# Patient Record
Sex: FEMALE | Race: WHITE | Employment: FULL TIME | URBAN - METROPOLITAN AREA
[De-identification: names, ages, dates, MRNs, and addresses within clinical notes are randomized per-mention and may not be internally consistent; named-entity substitution may affect disease eponyms.]

---

## 2017-01-04 ENCOUNTER — HOSPITAL ENCOUNTER (OUTPATIENT)
Dept: PHYSICAL THERAPY | Age: 62
Discharge: HOME | End: 2017-01-04
Payer: COMMERCIAL

## 2017-01-04 PROCEDURE — 97140 MANUAL THERAPY 1/> REGIONS: CPT

## 2017-01-04 NOTE — PROGRESS NOTES
Jeni Dockery   (DPM:7/75/0315) [address]  at  [facility] LISHA THOMAS  [address] [facility], [address] [place] [address],[address], [place] [address].  Phone: [phone] Fax: [phone]       Outpatient PHYSICAL THERAPY: Daily Note 1/4/2017  Fall Risk Score: 0 (? 5 = High Risk)  ICD-10: Treatment Diagnosis: Pain in left shoulder (M25.512); Bicipital tendinitis, left shoulder (M75.22); Complete rotator cuff tear or rupture of left shoulder, not specified as traumatic (Y57.942)  REFERRING PHYSICIAN: Joshua Combs MD MD Orders: Evaluate and treat, strengthening, ROM, full motion/full strength  Return Physician Appointment:1-11-17   MEDICAL/REFERRING DIAGNOSIS: s/p L shoulder EUA/ manipulation/ LAURENT/ scope ASD/ ADCR/ BT  DATE OF ONSET: 9-29-16   PRIOR LEVEL OF FUNCTION: independent with ADLs  PRECAUTIONS/ALLERGIES: codeine  ASSESSMENT:  ?????? ? ? This section established at most recent assessment??????????  Ms. Dedra Kimball presents s/p L shoulder scope with mini open rotator cuff repair and bicep tenodesis with surgery 9-29-16. She had an examination and manipulation of left shoulder under anesthesia on 12-9-16. She had improved passive and active ROM today after manipulation. She is slowly progressing with L shoulder active and passive ROM. She does report left hand pain/thumb pain that started after the surgery. She will benefit from continuing physical therapy to progress functional mobility in L UE. PROBLEM LIST (Impairments causing functional limitations):  1. Post-op L shoulder pain and swelling  2. Decreased L shoulder ROM   3. Weakness L shoulder   GOALS: (Goals have been discussed and agreed upon with patient.)  SHORT-TERM FUNCTIONAL GOALS: Time Frame: 6 weeeks  1. Report no more than 2/10 intermittent pain to Left shoulder with compensatory use during basic functional activities. Progressing towards  2.  Left shoulder PROM forward elevation greater than 140 degrees and external rotation greater than 60 degrees to progress into functional ranges. GOAL MET  3. Demonstrate good Left shoulder isometric strength with manual testing to progress into strength phase. GOAL MET  4. Independent with initial HEP. GOAL MET  DISCHARGE GOALS:  Time Frame: 12 weeks   1. No more than 1/10 intermittent pain Left shoulder with return to normalized household and work activities, and score less than 20% on the DASH. ongoing  2. Left shoulder AROM forward elevation greater than 135 degrees, external rotation greater than 60 degrees, and strength to shoulder are grossly WNL's for safe use with normalized activities. Progressing towards  1. Demonstrate good functional shoulder strength and endurance for return to normalized household and work activities. Progressing towards  2. Independent with advanced shoulder HEP for continued self-management. ongoing  REHABILITATION POTENTIAL FOR STATED GOALS: Emily Thakur OF CARE:  INTERVENTIONS PLANNED: (Benefits and precautions of physical therapy have been discussed with the patient.)  1. Thermal and electric modalities, manual therapies for pain   2. Manual therapies, therapeutic exercises, HEP for ROM    3. Therapeutic exercises and HEP for strength  TREATMENT PLAN EFFECTIVE DATES: 10-4-16 TO 12-30-16  FREQUENCY/DURATION: Follow patient 2 times a week for 12 weeks to address above goals. Pt increase to 3x/week if shoulder ROM decreases                        SUBJECTIVE:  Present Symptoms: Pt reports she has pain in the upper arm and she continues to have thumb pain. History of Present Injury/Illness (Reason for Referral): Shoulder pain for over a year. She tried to work out on her own. Pain with lifting arm all the way up and reaching behind back prior to surgery. Does not remember an injury but was a  and carried everything in her left hand. Surgery Thursday 9-29-16 and stayed one night in the hospital. 2nd nerve block Friday morning and therapy in the hospital prior to be discharged.  She has been icing the shoulder and taking the pain medicine. Shoulder manipulation done 12-9-16. Dominant Side: right  Past Medical History: hysterectomy, 2 C-sections  Current Medications: sertraline HCL, hydro morphine, promethazine, ketorolac, temazepam, aleve, tylenol   Date Last Reviewed: 1-4-16  Social History/Home Situation: Lives with  at home . Work/Activity History:  2 days/ week. 8 hours /week. Would like to return to working out. OBJECTIVE:  Outcome Measure: Tool Used: Disabilities of the Arm, Shoulder and Hand (DASH) Questionnaire - Quick Version  Score:  Initial: 25/55  Most Recent: 35/55 (Date: 11-1-16 )   Interpretation of Score: The DASH is designed to measure the activities of daily living in person's with upper extremity dysfunction or pain. Each section is scored on a 1-5 scale, 5 representing the greatest disability. The scores of each section are added together for a total score of 55. This number is divided by 11, followed by subtracting 1 and multiplying by 25 to get a percent score of disability. This value represents the percentage disability: 0-20% minimal disability; 20-40% moderate disability; 40-60% severe disability; % dependent for care or exaggerated symptom behavior. Minimal detectable change is 12%. Observation/Orthostatic Postural Assessment: not changed. Palpation: n/t    ROM: not tested                          Strength: n/t                 TREATMENT:    (In addition to Assessment/Re-Assessment sessions the following treatments were rendered)  Therapeutic Exercise (5 Minutes ) for improved shoulder ROM and mobility. Serratus punch 2x10, side lying ER 2x15, prone shoulder extension 2x10. Kinesio taping for posture with I strip across scapula. HEP: continue current HEP  Manual Therapy (Left Joint Mobilization Duration  Duration: 40 Minutes    ): for L shoulder ROM.  PROM to physiological mobilizations to the L shoulder for ER at neutral, ER at 45 deg abduction, IR, abduction, and forward elevation. Posterior and inferior glenohumeral joint glides grade 2-3 in mid and end range motions for improved mobility. Pt prone and glenohumeral anterior glides with belt to stabilize the scapula grade 4. Pt supine and cervical side glides. Therapeutic Modalities: none today                                                                                               __________________________________________________________________________________  Treatment Assessment: Pt reported thumb pain with cervical mobilizations. ER PROM seemed improved after prone glenohumeral anterior glides. Progression/Medical Necessity:   · Patient is expected to demonstrate progress in strength and range of motion to improved mobility. Compliance with Program/Exercises: yes per pt   Reason for Continuation of Services/Other Comments:  · Patient continues to require skilled intervention due to decreased ROM and UE strength. Recommendations/Intent for next treatment session: \"Treatment next visit will focus on L shoulder post-op\" ROM.     Total Treatment Duration:   45 minutes  PT Patient Time In/Time Out  Time In: 0930  Time Out: [address] Nilsa Abdi, PT, DPT

## 2017-01-06 ENCOUNTER — HOSPITAL ENCOUNTER (OUTPATIENT)
Dept: PHYSICAL THERAPY | Age: 62
Discharge: HOME | End: 2017-01-06
Payer: COMMERCIAL

## 2017-01-06 PROCEDURE — 97140 MANUAL THERAPY 1/> REGIONS: CPT

## 2017-01-06 NOTE — PROGRESS NOTES
Boris Ying   (KVN:1/16/6234) [address]  at  [facility] [place]  1101 [facility], [address] [place] [address],[address], [facility] [address]  Phone: [phone] Fax: [phone]       Outpatient PHYSICAL THERAPY: Daily Note, Progress Report and Recertification 2/3/4696  Fall Risk Score: 0 (? 5 = High Risk)  ICD-10: Treatment Diagnosis: Pain in left shoulder (M25.512); Bicipital tendinitis, left shoulder (M75.22); Complete rotator cuff tear or rupture of left shoulder, not specified as traumatic (T60.629)  REFERRING PHYSICIAN: Kera Trujillo MD MD Orders: Evaluate and treat, strengthening, ROM, full motion/full strength  Return Physician Appointment:1-11-17   MEDICAL/REFERRING DIAGNOSIS: s/p L shoulder EUA/ manipulation/ LAURENT/ scope ASD/ ADCR/ BT  DATE OF ONSET: 9-29-16   PRIOR LEVEL OF FUNCTION: independent with ADLs  PRECAUTIONS/ALLERGIES: codeine  ASSESSMENT:  ?????? ? ? This section established at most recent assessment??????????  Ms. Yris Espino presents s/p L shoulder scope with mini open rotator cuff repair and bicep tenodesis with surgery 9-29-16. She had an examination and manipulation of left shoulder under anesthesia on 12-9-16. She has progressed with L shoulder active and passive ROM. She does report left hand/thumb pain that started after the surgery and that seems to be her biggest complaint of pain. She will benefit from continuing physical therapy to progress functional mobility in L UE. PROBLEM LIST (Impairments causing functional limitations):  1. Post-op L shoulder pain and swelling  2. Decreased L shoulder ROM   3. Weakness L shoulder   GOALS: (Goals have been discussed and agreed upon with patient.)  SHORT-TERM FUNCTIONAL GOALS: Time Frame: 6 weeeks  1. Report no more than 2/10 intermittent pain to Left shoulder with compensatory use during basic functional activities. Progressing towards  2.  Left shoulder PROM forward elevation greater than 140 degrees and external rotation greater than 60 degrees to progress into functional ranges. GOAL MET  3. Demonstrate good Left shoulder isometric strength with manual testing to progress into strength phase. GOAL MET  4. Independent with initial HEP. GOAL MET  DISCHARGE GOALS:  Time Frame: 12 weeks   1. No more than 1/10 intermittent pain Left shoulder with return to normalized household and work activities, and score less than 20% on the DASH. ongoing  2. Left shoulder AROM forward elevation greater than 135 degrees, external rotation greater than 60 degrees, and strength to shoulder are grossly WNL's for safe use with normalized activities. MET for FE, progressing towards ER  1. Demonstrate good functional shoulder strength and endurance for return to normalized household and work activities. Progressing towards  2. Independent with advanced shoulder HEP for continued self-management. ongoing  REHABILITATION POTENTIAL FOR STATED GOALS: Calvin Morrow OF CARE:  INTERVENTIONS PLANNED: (Benefits and precautions of physical therapy have been discussed with the patient.)  1. Thermal and electric modalities, manual therapies for pain   2. Manual therapies, therapeutic exercises, HEP for ROM    3. Therapeutic exercises and HEP for strength  TREATMENT PLAN EFFECTIVE DATES: 12-30-16 to 2-24-17  FREQUENCY/DURATION: Follow patient 2 times a week for 12 weeks to address above goals. Pt increase to 3x/week if shoulder ROM decreases  Regarding Eliza Anthony's therapy, I certify that the treatment plan above will be carried out by a therapist or under their direction. Thank you for this referral,    Katie Duke, PT       Referring Physician Signature: Leeanne Lazaro MD          Date                                  SUBJECTIVE:  Present Symptoms: Pt reports she has been working on her HEP. Pain, throbbing and burning in L thumb. History of Present Injury/Illness (Reason for Referral): Shoulder pain for over a year. She tried to work out on her own.  Pain with lifting arm all the way up and reaching behind back prior to surgery. Does not remember an injury but was a  and carried everything in her left hand. Surgery Thursday 9-29-16 and stayed one night in the hospital. 2nd nerve block Friday morning and therapy in the hospital prior to be discharged. She has been icing the shoulder and taking the pain medicine. Shoulder manipulation done 12-9-16. Dominant Side: right  Past Medical History: hysterectomy, 2 C-sections  Current Medications: sertraline HCL, hydro morphine, promethazine, ketorolac, temazepam, aleve, tylenol   Date Last Reviewed: 1-4-16  Social History/Home Situation: Lives with  at home . Work/Activity History:  2 days/ week. 8 hours /week. Would like to return to working out. OBJECTIVE:  Outcome Measure: Tool Used: Disabilities of the Arm, Shoulder and Hand (DASH) Questionnaire - Quick Version  Score:  Initial: 25/55  35/55 (Date: 11-1-16 ) Date: 1-6-17 33/55   Interpretation of Score: The DASH is designed to measure the activities of daily living in person's with upper extremity dysfunction or pain. Each section is scored on a 1-5 scale, 5 representing the greatest disability. The scores of each section are added together for a total score of 55. This number is divided by 11, followed by subtracting 1 and multiplying by 25 to get a percent score of disability. This value represents the percentage disability: 0-20% minimal disability; 20-40% moderate disability; 40-60% severe disability; % dependent for care or exaggerated symptom behavior. Minimal detectable change is 12%. Observation/Orthostatic Postural Assessment: not changed.    Palpation: n/t  ROM:      LUE AROM  L Shoulder Flexion: 140  L Shoulder Internal Rotation:  (T12 behind back)  L Shoulder External Rotation: 40 (with arm at neutral)  LUE PROM  L Shoulder Flexion: 160  L Shoulder ABduction: 95  L Shoulder Internal Rotation: 70  L Shoulder External Rotation: 70 (at neutral, 80 at 90 deg abd)                    Strength: n/t                 TREATMENT:    (In addition to Assessment/Re-Assessment sessions the following treatments were rendered)  Therapeutic Exercise (10 Minutes ) for improved shoulder ROM and mobility. Serratus punch 2x10, side lying ER 25x, 1#2x10, prone shoulder extension1# 2x10, prone middle trap 2x10, prone lower trap 2x10 with tactile cues on scapula for correct positioning. leukotape applied to thoracic spine for posture with a \"x\". HEP: continue current HEP  Manual Therapy (Left Joint Mobilization Duration  Duration: 40 Minutes    ): for L shoulder ROM. PROM to physiological mobilizations to the L shoulder for ER at neutral, ER at 45 deg abduction, IR, abduction, and forward elevation. Posterior and inferior glenohumeral joint glides grade 2-3 in mid and end range motions for improved mobility. Pt prone and glenohumeral anterior glides with belt to stabilize the scapula grade 4. Pt supine and cervical side glides. Therapeutic Modalities: Cold to the L shoulder with vaso pneumatic  compression for 10 min with low compression                        Left Shoulder Cold  Type: Cold/ice pack                                                                      __________________________________________________________________________________  Treatment Assessment: Improved L shoulder ROM today. Progression/Medical Necessity:   · Patient is expected to demonstrate progress in strength and range of motion to improved mobility. Compliance with Program/Exercises: yes per pt   Reason for Continuation of Services/Other Comments:  · Patient continues to require skilled intervention due to decreased ROM and UE strength. Recommendations/Intent for next treatment session: \"Treatment next visit will focus on L shoulder post-op\" ROM.     Total Treatment Duration:  60 minutes  PT Patient Time In/Time Out  Time In: 0930  Time Out: [address], PT, DPT

## 2017-01-09 ENCOUNTER — HOSPITAL ENCOUNTER (OUTPATIENT)
Dept: PHYSICAL THERAPY | Age: 62
Discharge: HOME | End: 2017-01-09
Payer: COMMERCIAL

## 2017-01-09 PROCEDURE — 97110 THERAPEUTIC EXERCISES: CPT

## 2017-01-09 PROCEDURE — 97140 MANUAL THERAPY 1/> REGIONS: CPT

## 2017-01-09 NOTE — PROGRESS NOTES
Victorino Collier   (PKU:1/10/9768) [address]  at  [facility] [place]  [address] [facility], [address] [place] [address],[address], [place] [address].  Phone: [phone] Fax: [phone]       Outpatient PHYSICAL THERAPY: Daily Note 1/9/2017  Fall Risk Score: 0 (? 5 = High Risk)  ICD-10: Treatment Diagnosis: Pain in left shoulder (M25.512); Bicipital tendinitis, left shoulder (M75.22); Complete rotator cuff tear or rupture of left shoulder, not specified as traumatic (Y08.197)  REFERRING PHYSICIAN: Rasheed Portillo MD MD Orders: Evaluate and treat, strengthening, ROM, full motion/full strength  Return Physician Appointment:1-11-17   MEDICAL/REFERRING DIAGNOSIS: s/p L shoulder EUA/ manipulation/ LAURENT/ scope ASD/ ADCR/ BT  DATE OF ONSET: 9-29-16   PRIOR LEVEL OF FUNCTION: independent with ADLs  PRECAUTIONS/ALLERGIES: codeine  ASSESSMENT:  ?????? ? ? This section established at most recent assessment??????????  Ms. Navid Lima presents s/p L shoulder scope with mini open rotator cuff repair and bicep tenodesis with surgery 9-29-16. She had an examination and manipulation of left shoulder under anesthesia on 12-9-16. She has progressed with L shoulder active and passive ROM. She does report left hand/thumb pain that started after the surgery and that seems to be her biggest complaint of pain. She will benefit from continuing physical therapy to progress functional mobility in L UE. PROBLEM LIST (Impairments causing functional limitations):  1. Post-op L shoulder pain and swelling  2. Decreased L shoulder ROM   3. Weakness L shoulder   GOALS: (Goals have been discussed and agreed upon with patient.)  SHORT-TERM FUNCTIONAL GOALS: Time Frame: 6 weeeks  1. Report no more than 2/10 intermittent pain to Left shoulder with compensatory use during basic functional activities. Progressing towards  2. Left shoulder PROM forward elevation greater than 140 degrees and external rotation greater than 60 degrees to progress into functional ranges.  GOAL MET  3. Demonstrate good Left shoulder isometric strength with manual testing to progress into strength phase. GOAL MET  4. Independent with initial HEP. GOAL MET  DISCHARGE GOALS:  Time Frame: 12 weeks   1. No more than 1/10 intermittent pain Left shoulder with return to normalized household and work activities, and score less than 20% on the DASH. ongoing  2. Left shoulder AROM forward elevation greater than 135 degrees, external rotation greater than 60 degrees, and strength to shoulder are grossly WNL's for safe use with normalized activities. MET for FE, progressing towards ER  1. Demonstrate good functional shoulder strength and endurance for return to normalized household and work activities. Progressing towards  2. Independent with advanced shoulder HEP for continued self-management. ongoing  REHABILITATION POTENTIAL FOR STATED GOALS: Karthik Carson OF CARE:  INTERVENTIONS PLANNED: (Benefits and precautions of physical therapy have been discussed with the patient.)  1. Thermal and electric modalities, manual therapies for pain   2. Manual therapies, therapeutic exercises, HEP for ROM    3. Therapeutic exercises and HEP for strength  TREATMENT PLAN EFFECTIVE DATES: 12-30-16 to 2-24-17  FREQUENCY/DURATION: Follow patient 2 times a week for 12 weeks to address above goals. Pt increase to 3x/week if shoulder ROM decreases                        SUBJECTIVE:  Present Symptoms: Pt reports she continues to do HEP. She continues to have pain in her thumb that bothers her. History of Present Injury/Illness (Reason for Referral): Shoulder pain for over a year. She tried to work out on her own. Pain with lifting arm all the way up and reaching behind back prior to surgery. Does not remember an injury but was a  and carried everything in her left hand. Surgery Thursday 9-29-16 and stayed one night in the hospital. 2nd nerve block Friday morning and therapy in the hospital prior to be discharged.  She has been icing the shoulder and taking the pain medicine. Shoulder manipulation done 12-9-16. Dominant Side: right  Past Medical History: hysterectomy, 2 C-sections  Current Medications: sertraline HCL, hydro morphine, promethazine, ketorolac, temazepam, aleve, tylenol   Date Last Reviewed: 1-9-16  Social History/Home Situation: Lives with  at home . Work/Activity History:  2 days/ week. 8 hours /week. Would like to return to working out. OBJECTIVE:  Outcome Measure: Tool Used: Disabilities of the Arm, Shoulder and Hand (DASH) Questionnaire - Quick Version  Score:  Initial: 25/55 35/55 (Date: 11-1-16 ) Date: 1-6-17 33/55   Interpretation of Score: The DASH is designed to measure the activities of daily living in person's with upper extremity dysfunction or pain. Each section is scored on a 1-5 scale, 5 representing the greatest disability. The scores of each section are added together for a total score of 55. This number is divided by 11, followed by subtracting 1 and multiplying by 25 to get a percent score of disability. This value represents the percentage disability: 0-20% minimal disability; 20-40% moderate disability; 40-60% severe disability; % dependent for care or exaggerated symptom behavior. Minimal detectable change is 12%. Observation/Orthostatic Postural Assessment: not changed. Palpation: n/t  ROM: n/t                          Strength: n/t                 TREATMENT:    (In addition to Assessment/Re-Assessment sessions the following treatments were rendered)  Therapeutic Exercise (15 Minutes ) for improved shoulder ROM and mobility. Serratus punch 2x10 2# 2x10, side lying ER 1#2x15, prone shoulder extension1# 2x10, prone middle trap 2x10, prone lower trap 2x10 with tactile cues on scapula for correct positioning. HEP: continue current HEP  Manual Therapy (Left Joint Mobilization Duration  Duration: 35 Minutes    ): for L shoulder ROM.  PROM to physiological mobilizations to the L shoulder for ER at neutral, ER at 45 deg abduction, IR, abduction, and forward elevation. Posterior and inferior glenohumeral joint glides grade 2-3 in mid and end range motions for improved mobility. Pt supine and cervical side glides. Pt prone and thoracic and cervical spine central PAs grade 3-4. Therapeutic Modalities: none                                                                                               __________________________________________________________________________________  Treatment Assessment: She seems to be progressing with L shoulder active and passive ROM. No complaints of pain with treatment. Progression/Medical Necessity:   · Patient is expected to demonstrate progress in strength and range of motion to improved mobility. Compliance with Program/Exercises: yes per pt   Reason for Continuation of Services/Other Comments:  · Patient continues to require skilled intervention due to decreased ROM and UE strength. Recommendations/Intent for next treatment session: \"Treatment next visit will focus on L shoulder post-op\" ROM.     Total Treatment Duration:  50 minutes  PT Patient Time In/Time Out  Time In: 1130  Time Out: Kiersten Castillo1, DPMARLON

## 2017-01-11 ENCOUNTER — HOSPITAL ENCOUNTER (OUTPATIENT)
Dept: PHYSICAL THERAPY | Age: 62
Discharge: HOME | End: 2017-01-11
Payer: COMMERCIAL

## 2017-01-11 PROCEDURE — 97110 THERAPEUTIC EXERCISES: CPT

## 2017-01-11 PROCEDURE — 97140 MANUAL THERAPY 1/> REGIONS: CPT

## 2017-01-11 NOTE — PROGRESS NOTES
Elham Nestor   (XKZ:8/[id number]) [address]  at  [facility] LISHA THOMAS  [address] [facility], [address], [facility] [address]  Phone: [phone] Fax: [phone]       Outpatient PHYSICAL THERAPY: Daily Note 1/11/2017  Fall Risk Score: 0 (? 5 = High Risk)  ICD-10: Treatment Diagnosis: Pain in left shoulder (M25.512); Bicipital tendinitis, left shoulder (M75.22); Complete rotator cuff tear or rupture of left shoulder, not specified as traumatic (Y07.994)  REFERRING PHYSICIAN: Bassem aCnnon MD MD Orders: Evaluate and treat, strengthening, ROM, full motion/full strength  Return Physician Appointment:1-11-17   MEDICAL/REFERRING DIAGNOSIS: s/p L shoulder EUA/ manipulation/ LAURENT/ scope ASD/ ADCR/ BT  DATE OF ONSET: 9-29-16   PRIOR LEVEL OF FUNCTION: independent with ADLs  PRECAUTIONS/ALLERGIES: codeine  ASSESSMENT:  ?????? ? ? This section established at most recent assessment??????????  Ms. Dar Cole presents s/p L shoulder scope with mini open rotator cuff repair and bicep tenodesis with surgery 9-29-16. She had an examination and manipulation of left shoulder under anesthesia on 12-9-16. She has progressed with L shoulder active and passive ROM. She does report left hand/thumb pain that started after the surgery and that seems to be her biggest complaint of pain. She will benefit from continuing physical therapy to progress functional mobility in L UE. PROBLEM LIST (Impairments causing functional limitations):  1. Post-op L shoulder pain and swelling  2. Decreased L shoulder ROM   3. Weakness L shoulder   GOALS: (Goals have been discussed and agreed upon with patient.)  SHORT-TERM FUNCTIONAL GOALS: Time Frame: 6 weeeks  1. Report no more than 2/10 intermittent pain to Left shoulder with compensatory use during basic functional activities. Progressing towards  2. Left shoulder PROM forward elevation greater than 140 degrees and external rotation greater than 60 degrees to progress into functional ranges.  GOAL MET  3. Demonstrate good Left shoulder isometric strength with manual testing to progress into strength phase. GOAL MET  4. Independent with initial HEP. GOAL MET  DISCHARGE GOALS:  Time Frame: 12 weeks   1. No more than 1/10 intermittent pain Left shoulder with return to normalized household and work activities, and score less than 20% on the DASH. ongoing  2. Left shoulder AROM forward elevation greater than 135 degrees, external rotation greater than 60 degrees, and strength to shoulder are grossly WNL's for safe use with normalized activities. MET for FE, progressing towards ER  1. Demonstrate good functional shoulder strength and endurance for return to normalized household and work activities. Progressing towards  2. Independent with advanced shoulder HEP for continued self-management. ongoing  REHABILITATION POTENTIAL FOR STATED GOALS: Brittani Barker OF CARE:  INTERVENTIONS PLANNED: (Benefits and precautions of physical therapy have been discussed with the patient.)  1. Thermal and electric modalities, manual therapies for pain   2. Manual therapies, therapeutic exercises, HEP for ROM    3. Therapeutic exercises and HEP for strength  TREATMENT PLAN EFFECTIVE DATES: 12-30-16 to 2-24-17  FREQUENCY/DURATION: Follow patient 2 times a week for 12 weeks to address above goals. Pt increase to 3x/week if shoulder ROM decreases                        SUBJECTIVE:  Present Symptoms:  pt reports no new changes. History of Present Injury/Illness (Reason for Referral): Shoulder pain for over a year. She tried to work out on her own. Pain with lifting arm all the way up and reaching behind back prior to surgery. Does not remember an injury but was a  and carried everything in her left hand. Surgery Thursday 9-29-16 and stayed one night in the hospital. 2nd nerve block Friday morning and therapy in the hospital prior to be discharged. She has been icing the shoulder and taking the pain medicine.  Shoulder manipulation done 12-9-16. Dominant Side: right  Past Medical History: hysterectomy, 2 C-sections  Current Medications: sertraline HCL, hydro morphine, promethazine, ketorolac, temazepam, aleve, tylenol   Date Last Reviewed: 1-9-16  Social History/Home Situation: Lives with  at home . Work/Activity History:  2 days/ week. 8 hours /week. Would like to return to working out. OBJECTIVE:  Outcome Measure: Tool Used: Disabilities of the Arm, Shoulder and Hand (DASH) Questionnaire - Quick Version  Score:  Initial: 25/55  35/55 (Date: 11-1-16 ) Date: 1-6-17 33/55   Interpretation of Score: The DASH is designed to measure the activities of daily living in person's with upper extremity dysfunction or pain. Each section is scored on a 1-5 scale, 5 representing the greatest disability. The scores of each section are added together for a total score of 55. This number is divided by 11, followed by subtracting 1 and multiplying by 25 to get a percent score of disability. This value represents the percentage disability: 0-20% minimal disability; 20-40% moderate disability; 40-60% severe disability; % dependent for care or exaggerated symptom behavior. Minimal detectable change is 12%. Observation/Orthostatic Postural Assessment: not changed. Palpation: n/t  ROM: n/t                          Strength: n/t                 TREATMENT:    (In addition to Assessment/Re-Assessment sessions the following treatments were rendered)  Therapeutic Exercise (15 Minutes ) for improved shoulder ROM and mobility. Serratus punch 2x10 3# 2x10, side lying ER 2#2x10, prone shoulder extension2# 2x10, prone middle trap 1#2x10, prone lower trap 1#2x10 with verbal cues on scapula for correct positioning. HEP: continue current HEP  Manual Therapy (Left Joint Mobilization Duration  Duration: 35 Minutes    ): for L shoulder ROM.  PROM to physiological mobilizations to the L shoulder for ER at neutral, ER at 45 deg abduction, IR, abduction, and forward elevation. Posterior and inferior glenohumeral joint glides grade 2-3 in mid and end range motions for improved mobility. Pt supine and cervical side glides. Therapeutic Modalities: none                                                                                               __________________________________________________________________________________  Treatment Assessment: stiffness with shoulder horizontal abduction and tightness in L anterior shoulder. Progression/Medical Necessity:   · Patient is expected to demonstrate progress in strength and range of motion to improved mobility. Compliance with Program/Exercises: yes per pt   Reason for Continuation of Services/Other Comments:  · Patient continues to require skilled intervention due to decreased ROM and UE strength. Recommendations/Intent for next treatment session: \"Treatment next visit will focus on L shoulder post-op\" ROM.     Total Treatment Duration:  50 minutes  PT Patient Time In/Time Out  Time In: 2392  Time Out: [address], PT, DPT

## 2017-01-13 ENCOUNTER — HOSPITAL ENCOUNTER (OUTPATIENT)
Dept: PHYSICAL THERAPY | Age: 62
Discharge: HOME | End: 2017-01-13
Payer: COMMERCIAL

## 2017-01-13 PROCEDURE — 97140 MANUAL THERAPY 1/> REGIONS: CPT

## 2017-01-16 ENCOUNTER — HOSPITAL ENCOUNTER (OUTPATIENT)
Dept: PHYSICAL THERAPY | Age: 62
Discharge: HOME | End: 2017-01-16
Payer: COMMERCIAL

## 2017-01-16 PROCEDURE — 97140 MANUAL THERAPY 1/> REGIONS: CPT

## 2017-01-16 PROCEDURE — 97110 THERAPEUTIC EXERCISES: CPT

## 2017-01-16 NOTE — PROGRESS NOTES
Ben Dawson   (WAM:8/93/[id number]) [address]  at  [facility] LISHA THOMAS  [address] [facility], [address], [address] [facility]  Phone: [phone] Fax: [phone]       Outpatient PHYSICAL THERAPY: Daily Note 1/16/2017  Fall Risk Score: 0 (? 5 = High Risk)  ICD-10: Treatment Diagnosis: Pain in left shoulder (M25.512); Bicipital tendinitis, left shoulder (M75.22); Complete rotator cuff tear or rupture of left shoulder, not specified as traumatic (G83.460)  REFERRING PHYSICIAN: Suresh Bustamante MD MD Orders: Evaluate and treat, strengthening, ROM, full motion/full strength  Return Physician Appointment:1-11-17   MEDICAL/REFERRING DIAGNOSIS: s/p L shoulder EUA/ manipulation/ LAURENT/ scope ASD/ ADCR/ BT  DATE OF ONSET: 9-29-16   PRIOR LEVEL OF FUNCTION: independent with ADLs  PRECAUTIONS/ALLERGIES: codeine  ASSESSMENT:  ?????? ? ? This section established at most recent assessment??????????  Ms. Angeles Mcallister presents s/p L shoulder scope with mini open rotator cuff repair and bicep tenodesis with surgery 9-29-16. She had an examination and manipulation of left shoulder under anesthesia on 12-9-16. She has progressed with L shoulder active and passive ROM. She does report left hand/thumb pain that started after the surgery and that seems to be her biggest complaint of pain. She will benefit from continuing physical therapy to progress functional mobility in L UE. PROBLEM LIST (Impairments causing functional limitations):  1. Post-op L shoulder pain and swelling  2. Decreased L shoulder ROM   3. Weakness L shoulder   GOALS: (Goals have been discussed and agreed upon with patient.)  SHORT-TERM FUNCTIONAL GOALS: Time Frame: 6 weeeks  1. Report no more than 2/10 intermittent pain to Left shoulder with compensatory use during basic functional activities. Progressing towards  2. Left shoulder PROM forward elevation greater than 140 degrees and external rotation greater than 60 degrees to progress into functional ranges.  GOAL MET  3. Demonstrate good Left shoulder isometric strength with manual testing to progress into strength phase. GOAL MET  4. Independent with initial HEP. GOAL MET  DISCHARGE GOALS:  Time Frame: 12 weeks   1. No more than 1/10 intermittent pain Left shoulder with return to normalized household and work activities, and score less than 20% on the DASH. ongoing  2. Left shoulder AROM forward elevation greater than 135 degrees, external rotation greater than 60 degrees, and strength to shoulder are grossly WNL's for safe use with normalized activities. MET for FE, progressing towards ER  1. Demonstrate good functional shoulder strength and endurance for return to normalized household and work activities. Progressing towards  2. Independent with advanced shoulder HEP for continued self-management. ongoing  REHABILITATION POTENTIAL FOR STATED GOALS: Suman Hay OF CARE:  INTERVENTIONS PLANNED: (Benefits and precautions of physical therapy have been discussed with the patient.)  1. Thermal and electric modalities, manual therapies for pain   2. Manual therapies, therapeutic exercises, HEP for ROM    3. Therapeutic exercises and HEP for strength  TREATMENT PLAN EFFECTIVE DATES: 12-30-16 to 2-24-17  FREQUENCY/DURATION: Follow patient 2 times a week for 12 weeks to address above goals. Pt increase to 3x/week if shoulder ROM decreases                        SUBJECTIVE:  Present Symptoms:  pt reports the tool assisted technique to her forearm last session did not seem to help. History of Present Injury/Illness (Reason for Referral): Shoulder pain for over a year. She tried to work out on her own. Pain with lifting arm all the way up and reaching behind back prior to surgery. Does not remember an injury but was a  and carried everything in her left hand. Surgery Thursday 9-29-16 and stayed one night in the hospital. 2nd nerve block Friday morning and therapy in the hospital prior to be discharged.  She has been icing the shoulder and taking the pain medicine. Shoulder manipulation done 12-9-16. Dominant Side: right  Past Medical History: hysterectomy, 2 C-sections  Current Medications: sertraline HCL, hydro morphine, promethazine, ketorolac, temazepam, aleve, tylenol   Date Last Reviewed: 1-13-16  Social History/Home Situation: Lives with  at home . Work/Activity History:  2 days/ week. 8 hours /week. Would like to return to working out. OBJECTIVE:  Outcome Measure: Tool Used: Disabilities of the Arm, Shoulder and Hand (DASH) Questionnaire - Quick Version  Score:  Initial: 25/55  35/55 (Date: 11-1-16 ) Date: 1-6-17 33/55   Interpretation of Score: The DASH is designed to measure the activities of daily living in person's with upper extremity dysfunction or pain. Each section is scored on a 1-5 scale, 5 representing the greatest disability. The scores of each section are added together for a total score of 55. This number is divided by 11, followed by subtracting 1 and multiplying by 25 to get a percent score of disability. This value represents the percentage disability: 0-20% minimal disability; 20-40% moderate disability; 40-60% severe disability; % dependent for care or exaggerated symptom behavior. Minimal detectable change is 12%. Observation/Orthostatic Postural Assessment: not changed. Palpation: n/t  ROM: n/t                          Strength: n/t                 TREATMENT:    (In addition to Assessment/Re-Assessment sessions the following treatments were rendered)  Therapeutic Exercise (15 Minutes ) for UE strengthening. Moderate verbal and tactile cues on scapula for correct alignment. Educated in anterior shoulder stretch for home with using the door and shoulder extension.     Date:  1-18-17 Date:   Date:     Activity/Exercise Parameters Parameters Parameters   Serratus punch 3#2x15     Side lying ER 2#2x10     Prone shoulder ext 2#2x10     Prone middle trap 1#2x15     Prone lower trap 1#2x15                     HEP: continue current HEP  Manual Therapy (Left Joint Mobilization Duration  Duration: 30 Minutes    ): for L shoulder ROM. PROM to physiological mobilizations to the L shoulder for ER at neutral, ER at 45 deg abduction, IR, abduction, and forward elevation. Posterior and inferior glenohumeral joint glides grade 2-3 in mid and end range motions for improved mobility. Pt supine and cervical side glides. Shoulder abduction with shoulder extension ROM for pec major/minor stretching with stabilizing the scapula. Soft tissue mobilization to pec major with arm at neutral.   Therapeutic Modalities: none                                                                                               __________________________________________________________________________________  Treatment Assessment: She continues with pain/tingling in her left thumb. She seems to be progressing with UE strength. Progression/Medical Necessity:   · Patient is expected to demonstrate progress in strength and range of motion to improved mobility. Compliance with Program/Exercises: yes per pt   Reason for Continuation of Services/Other Comments:  · Patient continues to require skilled intervention due to decreased ROM and UE strength. Recommendations/Intent for next treatment session: \"Treatment next visit will focus on L shoulder post-op\" ROM.     Total Treatment Duration:  45 minutes  PT Patient Time In/Time Out  Time In: 1430  Time Out: [address] Cordero PT, DPT

## 2017-01-18 ENCOUNTER — HOSPITAL ENCOUNTER (OUTPATIENT)
Dept: PHYSICAL THERAPY | Age: 62
Discharge: HOME | End: 2017-01-18
Payer: COMMERCIAL

## 2017-01-18 PROCEDURE — 97110 THERAPEUTIC EXERCISES: CPT

## 2017-01-18 PROCEDURE — 97140 MANUAL THERAPY 1/> REGIONS: CPT

## 2017-01-18 NOTE — PROGRESS NOTES
Timbo Crimes   (SMI:4/00/7367) [address]  at  [facility] LISHA THOMAS  [address] [facility], [address], [facility] [address]  Phone: [phone] Fax: [phone]       Outpatient PHYSICAL THERAPY: Daily Note 1/18/2017  Fall Risk Score: 0 (? 5 = High Risk)  ICD-10: Treatment Diagnosis: Pain in left shoulder (M25.512); Bicipital tendinitis, left shoulder (M75.22); Complete rotator cuff tear or rupture of left shoulder, not specified as traumatic (E96.174)  REFERRING PHYSICIAN: Dwight Esposito MD MD Orders: Evaluate and treat, strengthening, ROM, full motion/full strength  Return Physician Appointment:1-11-17   MEDICAL/REFERRING DIAGNOSIS: s/p L shoulder EUA/ manipulation/ LAURENT/ scope ASD/ ADCR/ BT  DATE OF ONSET: 9-29-16   PRIOR LEVEL OF FUNCTION: independent with ADLs  PRECAUTIONS/ALLERGIES: codeine  ASSESSMENT:  ?????? ? ? This section established at most recent assessment??????????  Ms. Esperanza Huerta presents s/p L shoulder scope with mini open rotator cuff repair and bicep tenodesis with surgery 9-29-16. She had an examination and manipulation of left shoulder under anesthesia on 12-9-16. She has progressed with L shoulder active and passive ROM. She does report left hand/thumb pain that started after the surgery and that seems to be her biggest complaint of pain. She will benefit from continuing physical therapy to progress functional mobility in L UE. PROBLEM LIST (Impairments causing functional limitations):  1. Post-op L shoulder pain and swelling  2. Decreased L shoulder ROM   3. Weakness L shoulder   GOALS: (Goals have been discussed and agreed upon with patient.)  SHORT-TERM FUNCTIONAL GOALS: Time Frame: 6 weeeks  1. Report no more than 2/10 intermittent pain to Left shoulder with compensatory use during basic functional activities. Progressing towards  2. Left shoulder PROM forward elevation greater than 140 degrees and external rotation greater than 60 degrees to progress into functional ranges.  GOAL MET  3. Demonstrate good Left shoulder isometric strength with manual testing to progress into strength phase. GOAL MET  4. Independent with initial HEP. GOAL MET  DISCHARGE GOALS:  Time Frame: 12 weeks   1. No more than 1/10 intermittent pain Left shoulder with return to normalized household and work activities, and score less than 20% on the DASH. ongoing  2. Left shoulder AROM forward elevation greater than 135 degrees, external rotation greater than 60 degrees, and strength to shoulder are grossly WNL's for safe use with normalized activities. MET for FE, progressing towards ER  1. Demonstrate good functional shoulder strength and endurance for return to normalized household and work activities. Progressing towards  2. Independent with advanced shoulder HEP for continued self-management. ongoing  REHABILITATION POTENTIAL FOR STATED GOALS: Channing Richardson OF CARE:  INTERVENTIONS PLANNED: (Benefits and precautions of physical therapy have been discussed with the patient.)  1. Thermal and electric modalities, manual therapies for pain   2. Manual therapies, therapeutic exercises, HEP for ROM    3. Therapeutic exercises and HEP for strength  TREATMENT PLAN EFFECTIVE DATES: 12-30-16 to 2-24-17  FREQUENCY/DURATION: Follow patient 2 times a week for 12 weeks to address above goals. Pt increase to 3x/week if shoulder ROM decreases                        SUBJECTIVE:  Present Symptoms:  pt reports no change in pain. History of Present Injury/Illness (Reason for Referral): Shoulder pain for over a year. She tried to work out on her own. Pain with lifting arm all the way up and reaching behind back prior to surgery. Does not remember an injury but was a  and carried everything in her left hand. Surgery Thursday 9-29-16 and stayed one night in the hospital. 2nd nerve block Friday morning and therapy in the hospital prior to be discharged. She has been icing the shoulder and taking the pain medicine.  Shoulder manipulation done 12-9-16. Dominant Side: right  Past Medical History: hysterectomy, 2 C-sections  Current Medications: sertraline HCL, hydro morphine, promethazine, ketorolac, temazepam, aleve, tylenol   Date Last Reviewed: 1-18-16  Social History/Home Situation: Lives with  at home . Work/Activity History:  2 days/ week. 8 hours /week. Would like to return to working out. OBJECTIVE:  Outcome Measure: Tool Used: Disabilities of the Arm, Shoulder and Hand (DASH) Questionnaire - Quick Version  Score:  Initial: 25/55  35/55 (Date: 11-1-16 ) Date: 1-6-17 33/55   Interpretation of Score: The DASH is designed to measure the activities of daily living in person's with upper extremity dysfunction or pain. Each section is scored on a 1-5 scale, 5 representing the greatest disability. The scores of each section are added together for a total score of 55. This number is divided by 11, followed by subtracting 1 and multiplying by 25 to get a percent score of disability. This value represents the percentage disability: 0-20% minimal disability; 20-40% moderate disability; 40-60% severe disability; % dependent for care or exaggerated symptom behavior. Minimal detectable change is 12%. Observation/Orthostatic Postural Assessment: not changed. Palpation: n/t  ROM: n/t                          Strength: n/t                 TREATMENT:    (In addition to Assessment/Re-Assessment sessions the following treatments were rendered)  Therapeutic Exercise (20 Minutes ) for UE strengthening. Moderate verbal and tactile cues on scapula for correct alignment. Educated in anterior shoulder stretch for home with using the door and shoulder extension.     Date:  1-18-17 Date:   Date:     Activity/Exercise Parameters Parameters Parameters   Serratus punch 3#2x15     Side lying ER -     Prone shoulder ext 2#2x10     Prone middle trap 1#2x15     Prone lower trap 1#2x15     Eccentric ER in supine  Yellow 2x10     Prone ER 1#2x15     Scapular plane ER 1#2x15     Wall slides  Yellow 2x10         HEP: continue current HEP  Manual Therapy (Left Joint Mobilization Duration  Duration: 30 Minutes    ): for L shoulder ROM. PROM to physiological mobilizations to the L shoulder for ER at neutral, ER at 45 deg abduction, IR, abduction, and forward elevation. Posterior and inferior glenohumeral joint glides grade 2-3 in mid and end range motions for improved mobility. Pt supine and cervical side glides and cervical traction. Therapeutic Modalities: none                                                                                               __________________________________________________________________________________  Treatment Assessment: Pt reports tingling in Left thumb after manual treatment and exercises but improved after manual cervical traction done at end of treatment. Progression/Medical Necessity:   · Patient is expected to demonstrate progress in strength and range of motion to improved mobility. Compliance with Program/Exercises: yes per pt   Reason for Continuation of Services/Other Comments:  · Patient continues to require skilled intervention due to decreased ROM and UE strength. Recommendations/Intent for next treatment session: \"Treatment next visit will focus on L shoulder post-op\" ROM.     Total Treatment Duration:  50 minutes  PT Patient Time In/Time Out  Time In: 3495  Time Out: [address] F[address] Kaur, HENNY

## 2017-01-20 ENCOUNTER — HOSPITAL ENCOUNTER (OUTPATIENT)
Dept: PHYSICAL THERAPY | Age: 62
Discharge: HOME | End: 2017-01-20
Payer: COMMERCIAL

## 2017-01-20 PROCEDURE — 97110 THERAPEUTIC EXERCISES: CPT

## 2017-01-20 PROCEDURE — 97140 MANUAL THERAPY 1/> REGIONS: CPT

## 2017-01-20 NOTE — PROGRESS NOTES
Balaji Hernandez   (MGO:[id number]) [address]  at  [facility] LISHA THOMAS  [address], [address], [facility] [place]  Phone: [phone] Fax: [phone]       Outpatient PHYSICAL THERAPY: Daily Note 1/20/2017  Fall Risk Score: 0 (? 5 = High Risk)  ICD-10: Treatment Diagnosis: Pain in left shoulder (M25.512); Bicipital tendinitis, left shoulder (M75.22); Complete rotator cuff tear or rupture of left shoulder, not specified as traumatic (L03.973)  REFERRING PHYSICIAN: Hossein Cordova MD MD Orders: Evaluate and treat, strengthening, ROM, full motion/full strength  Return Physician Appointment:1-11-17   MEDICAL/REFERRING DIAGNOSIS: s/p L shoulder EUA/ manipulation/ ALURENT/ scope ASD/ ADCR/ BT  DATE OF ONSET: 9-29-16   PRIOR LEVEL OF FUNCTION: independent with ADLs  PRECAUTIONS/ALLERGIES: codeine  ASSESSMENT:  ?????? ? ? This section established at most recent assessment??????????  Ms. Judah Odom presents s/p L shoulder scope with mini open rotator cuff repair and bicep tenodesis with surgery 9-29-16. She had an examination and manipulation of left shoulder under anesthesia on 12-9-16. She has progressed with L shoulder active and passive ROM. She does report left hand/thumb pain that started after the surgery and that seems to be her biggest complaint of pain. She will benefit from continuing physical therapy to progress functional mobility in L UE. PROBLEM LIST (Impairments causing functional limitations):  1. Post-op L shoulder pain and swelling  2. Decreased L shoulder ROM   3. Weakness L shoulder   GOALS: (Goals have been discussed and agreed upon with patient.)  SHORT-TERM FUNCTIONAL GOALS: Time Frame: 6 weeeks  1. Report no more than 2/10 intermittent pain to Left shoulder with compensatory use during basic functional activities. Progressing towards  2. Left shoulder PROM forward elevation greater than 140 degrees and external rotation greater than 60 degrees to progress into functional ranges.  GOAL MET  3. Demonstrate good Left shoulder isometric strength with manual testing to progress into strength phase. GOAL MET  4. Independent with initial HEP. GOAL MET  DISCHARGE GOALS:  Time Frame: 12 weeks   1. No more than 1/10 intermittent pain Left shoulder with return to normalized household and work activities, and score less than 20% on the DASH. ongoing  2. Left shoulder AROM forward elevation greater than 135 degrees, external rotation greater than 60 degrees, and strength to shoulder are grossly WNL's for safe use with normalized activities. MET for FE, progressing towards ER  1. Demonstrate good functional shoulder strength and endurance for return to normalized household and work activities. Progressing towards  2. Independent with advanced shoulder HEP for continued self-management. ongoing  REHABILITATION POTENTIAL FOR STATED GOALS: Chepe Tompkins OF CARE:  INTERVENTIONS PLANNED: (Benefits and precautions of physical therapy have been discussed with the patient.)  1. Thermal and electric modalities, manual therapies for pain   2. Manual therapies, therapeutic exercises, HEP for ROM    3. Therapeutic exercises and HEP for strength  TREATMENT PLAN EFFECTIVE DATES: 12-30-16 to 2-24-17  FREQUENCY/DURATION: Follow patient 2 times a week for 12 weeks to address above goals. Pt increase to 3x/week if shoulder ROM decreases                        SUBJECTIVE:  Present Symptoms:  pt reports no change in pain. History of Present Injury/Illness (Reason for Referral): Shoulder pain for over a year. She tried to work out on her own. Pain with lifting arm all the way up and reaching behind back prior to surgery. Does not remember an injury but was a  and carried everything in her left hand. Surgery Thursday 9-29-16 and stayed one night in the hospital. 2nd nerve block Friday morning and therapy in the hospital prior to be discharged. She has been icing the shoulder and taking the pain medicine.  Shoulder manipulation done 12-9-16. Dominant Side: right  Past Medical History: hysterectomy, 2 C-sections  Current Medications: sertraline HCL, hydro morphine, promethazine, ketorolac, temazepam, aleve, tylenol   Date Last Reviewed: 1-18-16  Social History/Home Situation: Lives with  at home . Work/Activity History:  2 days/ week. 8 hours /week. Would like to return to working out. OBJECTIVE:  Outcome Measure: Tool Used: Disabilities of the Arm, Shoulder and Hand (DASH) Questionnaire - Quick Version  Score:  Initial: 25/55  35/55 (Date: 11-1-16 ) Date: 1-6-17 33/55   Interpretation of Score: The DASH is designed to measure the activities of daily living in person's with upper extremity dysfunction or pain. Each section is scored on a 1-5 scale, 5 representing the greatest disability. The scores of each section are added together for a total score of 55. This number is divided by 11, followed by subtracting 1 and multiplying by 25 to get a percent score of disability. This value represents the percentage disability: 0-20% minimal disability; 20-40% moderate disability; 40-60% severe disability; % dependent for care or exaggerated symptom behavior. Minimal detectable change is 12%. Observation/Orthostatic Postural Assessment: not changed. Palpation: n/t  ROM: n/t                          Strength: n/t                 TREATMENT:    (In addition to Assessment/Re-Assessment sessions the following treatments were rendered)  Therapeutic Exercise (15 Minutes ) for UE strengthening. Moderate verbal and tactile cues on scapula for correct alignment.      Date:  1-18-17 Date:  1-20-17 Date:     Activity/Exercise Parameters Parameters Parameters   Serratus punch 3#2x15 4#2x15    Side lying ER -     Prone shoulder ext 2#2x10 2#2x15    Prone middle trap 1#2x15 1#2x15    Prone lower trap 1#2x15 1#2x15    Eccentric ER in supine  Yellow 2x10 -    Prone ER 1#2x15 -    Scapular plane ER 1#2x15 -    Wall slides  Yellow 2x10 -        HEP: continue current HEP  Manual Therapy (Left Joint Mobilization Duration  Duration: 30 Minutes    ): for L shoulder ROM. PROM to physiological mobilizations to the L shoulder for ER at neutral, ER at 45 deg abduction, IR, abduction, and forward elevation. Posterior and inferior glenohumeral joint glides grade 2-3 in mid and end range motions for improved mobility. Pt supine and cervical side glides and cervical traction. Therapeutic Modalities: none                                                                                               __________________________________________________________________________________  Treatment Assessment: She continues with tightness in anterior L shoulder. Progression/Medical Necessity:   · Patient is expected to demonstrate progress in strength and range of motion to improved mobility. Compliance with Program/Exercises: yes per pt   Reason for Continuation of Services/Other Comments:  · Patient continues to require skilled intervention due to decreased ROM and UE strength. Recommendations/Intent for next treatment session: \"Treatment next visit will focus on L shoulder post-op\" ROM.     Total Treatment Duration:  45 minutes  PT Patient Time In/Time Out  Time In: 1010  Time Out: [address], PT, DPT

## 2017-01-23 ENCOUNTER — HOSPITAL ENCOUNTER (OUTPATIENT)
Dept: PHYSICAL THERAPY | Age: 62
Discharge: HOME | End: 2017-01-23
Payer: COMMERCIAL

## 2017-01-23 PROCEDURE — 97140 MANUAL THERAPY 1/> REGIONS: CPT

## 2017-01-23 PROCEDURE — 97110 THERAPEUTIC EXERCISES: CPT

## 2017-01-23 NOTE — PROGRESS NOTES
Jeanne Miller   (FNO:[id number]) [address]  at  [facility] LISHA THOMAS  [address], [address], [facility]. [address].  Phone: [phone] Fax: [phone]       Outpatient PHYSICAL THERAPY: Daily Note 1/23/2017  Fall Risk Score: 0 (? 5 = High Risk)  ICD-10: Treatment Diagnosis: Pain in left shoulder (M25.512); Bicipital tendinitis, left shoulder (M75.22); Complete rotator cuff tear or rupture of left shoulder, not specified as traumatic (R27.185)  REFERRING PHYSICIAN: Faizan Clement MD MD Orders: Evaluate and treat, strengthening, ROM, full motion/full strength  Return Physician Appointment:1-11-17   MEDICAL/REFERRING DIAGNOSIS: s/p L shoulder EUA/ manipulation/ LAURENT/ scope ASD/ ADCR/ BT  DATE OF ONSET: 9-29-16   PRIOR LEVEL OF FUNCTION: independent with ADLs  PRECAUTIONS/ALLERGIES: codeine  ASSESSMENT:  ?????? ? ? This section established at most recent assessment??????????  Ms. Mirta Mccullough presents s/p L shoulder scope with mini open rotator cuff repair and bicep tenodesis with surgery 9-29-16. She had an examination and manipulation of left shoulder under anesthesia on 12-9-16. She has progressed with L shoulder active and passive ROM. She does report left hand/thumb pain that started after the surgery and that seems to be her biggest complaint of pain. She will benefit from continuing physical therapy to progress functional mobility in L UE. PROBLEM LIST (Impairments causing functional limitations):  1. Post-op L shoulder pain and swelling  2. Decreased L shoulder ROM   3. Weakness L shoulder   GOALS: (Goals have been discussed and agreed upon with patient.)  SHORT-TERM FUNCTIONAL GOALS: Time Frame: 6 weeeks  1. Report no more than 2/10 intermittent pain to Left shoulder with compensatory use during basic functional activities. Progressing towards  2. Left shoulder PROM forward elevation greater than 140 degrees and external rotation greater than 60 degrees to progress into functional ranges.  GOAL MET  3. Demonstrate good Left shoulder isometric strength with manual testing to progress into strength phase. GOAL MET  4. Independent with initial HEP. GOAL MET  DISCHARGE GOALS:  Time Frame: 12 weeks   1. No more than 1/10 intermittent pain Left shoulder with return to normalized household and work activities, and score less than 20% on the DASH. ongoing  2. Left shoulder AROM forward elevation greater than 135 degrees, external rotation greater than 60 degrees, and strength to shoulder are grossly WNL's for safe use with normalized activities. MET for FE, progressing towards ER  1. Demonstrate good functional shoulder strength and endurance for return to normalized household and work activities. Progressing towards  2. Independent with advanced shoulder HEP for continued self-management. ongoing  REHABILITATION POTENTIAL FOR STATED GOALS: Cristopher Pummel OF CARE:  INTERVENTIONS PLANNED: (Benefits and precautions of physical therapy have been discussed with the patient.)  1. Thermal and electric modalities, manual therapies for pain   2. Manual therapies, therapeutic exercises, HEP for ROM    3. Therapeutic exercises and HEP for strength  TREATMENT PLAN EFFECTIVE DATES: 12-30-16 to 2-24-17  FREQUENCY/DURATION: Follow patient 2 times a week for 12 weeks to address above goals. Pt increase to 3x/week if shoulder ROM decreases                        SUBJECTIVE:  Present Symptoms:  pt reports she has her nerve conduction test tomorrow. History of Present Injury/Illness (Reason for Referral): Shoulder pain for over a year. She tried to work out on her own. Pain with lifting arm all the way up and reaching behind back prior to surgery. Does not remember an injury but was a  and carried everything in her left hand. Surgery Thursday 9-29-16 and stayed one night in the hospital. 2nd nerve block Friday morning and therapy in the hospital prior to be discharged.  She has been icing the shoulder and taking the pain medicine. Shoulder manipulation done 12-9-16. Dominant Side: right  Past Medical History: hysterectomy, 2 C-sections  Current Medications: sertraline HCL, hydro morphine, promethazine, ketorolac, temazepam, aleve, tylenol   Date Last Reviewed: 1-18-17  Social History/Home Situation: Lives with  at home . Work/Activity History:  2 days/ week. 8 hours /week. Would like to return to working out. OBJECTIVE:  Outcome Measure: Tool Used: Disabilities of the Arm, Shoulder and Hand (DASH) Questionnaire - Quick Version  Score:  Initial: 25/55  35/55 (Date: 11-1-16 ) Date: 1-6-17 33/55   Interpretation of Score: The DASH is designed to measure the activities of daily living in person's with upper extremity dysfunction or pain. Each section is scored on a 1-5 scale, 5 representing the greatest disability. The scores of each section are added together for a total score of 55. This number is divided by 11, followed by subtracting 1 and multiplying by 25 to get a percent score of disability. This value represents the percentage disability: 0-20% minimal disability; 20-40% moderate disability; 40-60% severe disability; % dependent for care or exaggerated symptom behavior. Minimal detectable change is 12%. Observation/Orthostatic Postural Assessment: not changed. Palpation: n/t  ROM: n/t                          Strength: n/t                 TREATMENT:    (In addition to Assessment/Re-Assessment sessions the following treatments were rendered)  Therapeutic Exercise (15 Minutes ) for UE strengthening. Moderate verbal and tactile cues on scapula for correct alignment.      Date:  1-18-17 Date:  1-20-17 Date:     Activity/Exercise Parameters Parameters Parameters   Serratus punch 3#2x15 4#2x15 4# 2x15   Side lying ER -  2#2x15   Prone shoulder ext 2#2x10 2#2x15 3#2x15   Prone middle trap 1#2x15 1#2x15 2#2x10   Prone lower trap 1#2x15 1#2x15 2#2x10   Eccentric ER in supine Yellow 2x10 - Red 2x10   Prone ER 1#2x15 -    Scapular plane ER 1#2x15 - 2#2x15   Wall slides  Yellow 2x10 -        HEP: continue current HEP  Manual Therapy (Left Joint Mobilization Duration  Duration: 30 Minutes    ): for L shoulder ROM. PROM to physiological mobilizations to the L shoulder for ER at neutral, ER at 45 deg abduction, IR, abduction, and forward elevation. Posterior and inferior glenohumeral joint glides grade 2-3 in mid and end range motions for improved mobility. Pt supine and cervical side glides and cervical traction. Therapeutic Modalities: none                                                                                               __________________________________________________________________________________  Treatment Assessment: Increased strengthening weight or reps with exercises today and pt did well with no complaints of pain. Progression/Medical Necessity:   · Patient is expected to demonstrate progress in strength and range of motion to improved mobility. Compliance with Program/Exercises: yes per pt   Reason for Continuation of Services/Other Comments:  · Patient continues to require skilled intervention due to decreased ROM and UE strength. Recommendations/Intent for next treatment session: \"Treatment next visit will focus on L shoulder post-op\" ROM.     Total Treatment Duration:  45 minutes  PT Patient Time In/Time Out  Time In: 1345  Time Out: [address], PT, DPT

## 2017-01-25 ENCOUNTER — HOSPITAL ENCOUNTER (OUTPATIENT)
Dept: PHYSICAL THERAPY | Age: 62
Discharge: HOME | End: 2017-01-25
Payer: COMMERCIAL

## 2017-01-25 PROCEDURE — 97110 THERAPEUTIC EXERCISES: CPT

## 2017-01-25 PROCEDURE — 97140 MANUAL THERAPY 1/> REGIONS: CPT

## 2017-01-25 NOTE — PROGRESS NOTES
Radha Escobar   (KYN:0/[id number]) [address]  at  [facility] LISHA THOMAS  [address], [address], [facility] [address]  Phone: [phone] Fax: [phone]       Outpatient PHYSICAL THERAPY: Daily Note 1/25/2017  Fall Risk Score: 0 (? 5 = High Risk)  ICD-10: Treatment Diagnosis: Pain in left shoulder (M25.512); Bicipital tendinitis, left shoulder (M75.22); Complete rotator cuff tear or rupture of left shoulder, not specified as traumatic (Z57.916)  REFERRING PHYSICIAN: Rock Steele MD MD Orders: Evaluate and treat, strengthening, ROM, full motion/full strength  Return Physician Appointment:1-11-17   MEDICAL/REFERRING DIAGNOSIS: s/p L shoulder EUA/ manipulation/ LAURENT/ scope ASD/ ADCR/ BT  DATE OF ONSET: 9-29-16   PRIOR LEVEL OF FUNCTION: independent with ADLs  PRECAUTIONS/ALLERGIES: codeine  ASSESSMENT:  ?????? ? ? This section established at most recent assessment??????????  Ms. Barrie Schilder presents s/p L shoulder scope with mini open rotator cuff repair and bicep tenodesis with surgery 9-29-16. She had an examination and manipulation of left shoulder under anesthesia on 12-9-16. She has progressed with L shoulder active and passive ROM. She does report left hand/thumb pain that started after the surgery and that seems to be her biggest complaint of pain. She will benefit from continuing physical therapy to progress functional mobility in L UE. PROBLEM LIST (Impairments causing functional limitations):  1. Post-op L shoulder pain and swelling  2. Decreased L shoulder ROM   3. Weakness L shoulder   GOALS: (Goals have been discussed and agreed upon with patient.)  SHORT-TERM FUNCTIONAL GOALS: Time Frame: 6 weeeks  1. Report no more than 2/10 intermittent pain to Left shoulder with compensatory use during basic functional activities. Progressing towards  2. Left shoulder PROM forward elevation greater than 140 degrees and external rotation greater than 60 degrees to progress into functional ranges.  GOAL MET  3. Demonstrate good Left shoulder isometric strength with manual testing to progress into strength phase. GOAL MET  4. Independent with initial HEP. GOAL MET  DISCHARGE GOALS:  Time Frame: 12 weeks   1. No more than 1/10 intermittent pain Left shoulder with return to normalized household and work activities, and score less than 20% on the DASH. ongoing  2. Left shoulder AROM forward elevation greater than 135 degrees, external rotation greater than 60 degrees, and strength to shoulder are grossly WNL's for safe use with normalized activities. MET for FE, progressing towards ER  1. Demonstrate good functional shoulder strength and endurance for return to normalized household and work activities. Progressing towards  2. Independent with advanced shoulder HEP for continued self-management. ongoing  REHABILITATION POTENTIAL FOR STATED GOALS: Robina Lopez OF CARE:  INTERVENTIONS PLANNED: (Benefits and precautions of physical therapy have been discussed with the patient.)  1. Thermal and electric modalities, manual therapies for pain   2. Manual therapies, therapeutic exercises, HEP for ROM    3. Therapeutic exercises and HEP for strength  TREATMENT PLAN EFFECTIVE DATES: 12-30-16 to 2-24-17  FREQUENCY/DURATION: Follow patient 2 times a week for 12 weeks to address above goals. Pt increase to 3x/week if shoulder ROM decreases                        SUBJECTIVE:  Present Symptoms:  pt reports she had nerve conduction study done yesterday and thinks she has a problem with the brachial plexus. History of Present Injury/Illness (Reason for Referral): Shoulder pain for over a year. She tried to work out on her own. Pain with lifting arm all the way up and reaching behind back prior to surgery. Does not remember an injury but was a  and carried everything in her left hand.  Surgery Thursday 9-29-16 and stayed one night in the hospital. 2nd nerve block Friday morning and therapy in the hospital prior to be discharged. She has been icing the shoulder and taking the pain medicine. Shoulder manipulation done 12-9-16. Dominant Side: right  Past Medical History: hysterectomy, 2 C-sections  Current Medications: sertraline HCL, hydro morphine, promethazine, ketorolac, temazepam, aleve, tylenol   Date Last Reviewed: 1-25-17  Social History/Home Situation: Lives with  at home . Work/Activity History:  2 days/ week. 8 hours /week. Would like to return to working out. OBJECTIVE:  Outcome Measure: Tool Used: Disabilities of the Arm, Shoulder and Hand (DASH) Questionnaire - Quick Version  Score:  Initial: 25/55  35/55 (Date: 11-1-16 ) Date: 1-6-17 33/55   Interpretation of Score: The DASH is designed to measure the activities of daily living in person's with upper extremity dysfunction or pain. Each section is scored on a 1-5 scale, 5 representing the greatest disability. The scores of each section are added together for a total score of 55. This number is divided by 11, followed by subtracting 1 and multiplying by 25 to get a percent score of disability. This value represents the percentage disability: 0-20% minimal disability; 20-40% moderate disability; 40-60% severe disability; % dependent for care or exaggerated symptom behavior. Minimal detectable change is 12%. Observation/Orthostatic Postural Assessment: not changed. Palpation: n/t  ROM: n/t                          Strength: n/t                 TREATMENT:    (In addition to Assessment/Re-Assessment sessions the following treatments were rendered)  Therapeutic Exercise (15 Minutes ) for UE strengthening. Moderate verbal and tactile cues on scapula for correct alignment. Instructed in retraction then cervical extension x10 reps to see if helps numbness in hand.     Date:  1-18-17 Date:  1-20-17 Date:  1-23-17 Date  1-25-17   Activity/Exercise Parameters Parameters Parameters parameters   Serratus punch 3#2x15 4#2x15 4# 2x15 4#2x15   Side lying ER -  2#2x15 2#2x15   Prone shoulder ext 2#2x10 2#2x15 3#2x15 3#2x15   Prone middle trap 1#2x15 1#2x15 2#2x10 2#2x10   Prone lower trap 1#2x15 1#2x15 2#2x10 2#2x10   Eccentric ER in supine  Yellow 2x10 - Red 2x10    Prone ER 1#2x15 -     Scapular plane ER 1#2x15 - 2#2x15    Wall slides  Yellow 2x10 -     Wrist flexion and extension - - - 2# 2x10 ea   gripper - - - Blue 2x15       HEP: continue current HEP  Manual Therapy (Left Joint Mobilization Duration  Duration: 30 Minutes    ): for L shoulder ROM. PROM to physiological mobilizations to the L shoulder for ER at neutral, ER at 45 deg abduction, IR, abduction, and forward elevation. Posterior and inferior glenohumeral joint glides grade 2-3 in mid and end range motions for improved mobility. Pt supine and cervical side glides and cervical traction. Therapeutic Modalities: none                                                                                               __________________________________________________________________________________  Treatment Assessment: she continues with numbness and tingling in the left hand. She works hard with exercises. Progression/Medical Necessity:   · Patient is expected to demonstrate progress in strength and range of motion to improved mobility. Compliance with Program/Exercises: yes per pt   Reason for Continuation of Services/Other Comments:  · Patient continues to require skilled intervention due to decreased ROM and UE strength. Recommendations/Intent for next treatment session: \"Treatment next visit will focus on L shoulder post-op\" ROM.     Total Treatment Duration:  45 minutes  PT Patient Time In/Time Out  Time In: 0930  Time Out: [address], PT, DPT

## 2017-01-27 ENCOUNTER — APPOINTMENT (OUTPATIENT)
Dept: PHYSICAL THERAPY | Age: 62
End: 2017-01-27
Payer: COMMERCIAL

## 2017-01-30 ENCOUNTER — HOSPITAL ENCOUNTER (OUTPATIENT)
Dept: PHYSICAL THERAPY | Age: 62
End: 2017-01-30
Payer: COMMERCIAL

## 2017-02-07 ENCOUNTER — HOSPITAL ENCOUNTER (OUTPATIENT)
Dept: PHYSICAL THERAPY | Age: 62
Discharge: HOME | End: 2017-02-07
Payer: COMMERCIAL

## 2017-02-07 PROCEDURE — 97110 THERAPEUTIC EXERCISES: CPT

## 2017-02-07 PROCEDURE — 97140 MANUAL THERAPY 1/> REGIONS: CPT

## 2017-02-07 NOTE — PROGRESS NOTES
Kate Dominguez   (SIF:0/05/6230) Therapy Center at  [facility] LISHA THOMAS  [address] [place] [address], [address] [place] [address],[address], [facility] [address]  Phone: [phone] Fax: [phone]       Outpatient PHYSICAL THERAPY: Daily Note 2/7/2017  Fall Risk Score: 0 (? 5 = High Risk)  ICD-10: Treatment Diagnosis: Pain in left shoulder (M25.512); Bicipital tendinitis, left shoulder (M75.22); Complete rotator cuff tear or rupture of left shoulder, not specified as traumatic (U41.389)  REFERRING PHYSICIAN: Kathleen Cao MD MD Orders: Evaluate and treat, strengthening, ROM, full motion/full strength  Return Physician Appointment:1-11-17   MEDICAL/REFERRING DIAGNOSIS: s/p L shoulder EUA/ manipulation/ LAURENT/ scope ASD/ ADCR/ BT  DATE OF ONSET: 9-29-16   PRIOR LEVEL OF FUNCTION: independent with ADLs  PRECAUTIONS/ALLERGIES: codeine  ASSESSMENT:  ?????? ? ? This section established at most recent assessment??????????  Ms. Norris January presents s/p L shoulder scope with mini open rotator cuff repair and bicep tenodesis with surgery 9-29-16. She had an examination and manipulation of left shoulder under anesthesia on 12-9-16. She has progressed with L shoulder active and passive ROM. She does report left hand/thumb pain that started after the surgery and that seems to be her biggest complaint of pain. She will benefit from continuing physical therapy to progress functional mobility in L UE. PROBLEM LIST (Impairments causing functional limitations):  1. Post-op L shoulder pain and swelling  2. Decreased L shoulder ROM   3. Weakness L shoulder   GOALS: (Goals have been discussed and agreed upon with patient.)  SHORT-TERM FUNCTIONAL GOALS: Time Frame: 6 weeeks  1. Report no more than 2/10 intermittent pain to Left shoulder with compensatory use during basic functional activities. Progressing towards  2. Left shoulder PROM forward elevation greater than 140 degrees and external rotation greater than 60 degrees to progress into functional ranges.  GOAL MET  3. Demonstrate good Left shoulder isometric strength with manual testing to progress into strength phase. GOAL MET  4. Independent with initial HEP. GOAL MET  DISCHARGE GOALS:  Time Frame: 12 weeks   1. No more than 1/10 intermittent pain Left shoulder with return to normalized household and work activities, and score less than 20% on the DASH. ongoing  2. Left shoulder AROM forward elevation greater than 135 degrees, external rotation greater than 60 degrees, and strength to shoulder are grossly WNL's for safe use with normalized activities. MET for FE, progressing towards ER  1. Demonstrate good functional shoulder strength and endurance for return to normalized household and work activities. Progressing towards  2. Independent with advanced shoulder HEP for continued self-management. ongoing  REHABILITATION POTENTIAL FOR STATED GOALS: [facility] OF CARE:  INTERVENTIONS PLANNED: (Benefits and precautions of physical therapy have been discussed with the patient.)  1. Thermal and electric modalities, manual therapies for pain   2. Manual therapies, therapeutic exercises, HEP for ROM    3. Therapeutic exercises and HEP for strength  TREATMENT PLAN EFFECTIVE DATES: 12-30-16 to 2-24-17  FREQUENCY/DURATION: Follow patient 2 times a week for 12 weeks to address above goals. Pt increase to 3x/week if shoulder ROM decreases                        SUBJECTIVE:  Present Symptoms:  pt reports she has started taking lyrica for her numbness in tingling in the left hand but is not sure if she can tell a difference. History of Present Injury/Illness (Reason for Referral): Shoulder pain for over a year. She tried to work out on her own. Pain with lifting arm all the way up and reaching behind back prior to surgery. Does not remember an injury but was a  and carried everything in her left hand.  Surgery Thursday 9-29-16 and stayed one night in the hospital. 2nd nerve block Friday morning and therapy in the hospital prior to be discharged. She has been icing the shoulder and taking the pain medicine. Shoulder manipulation done 12-9-16. Dominant Side: right  Past Medical History: hysterectomy, 2 C-sections  Current Medications: sertraline HCL, hydro morphine, promethazine, ketorolac, temazepam, aleve, tylenol, lyrica   Date Last Reviewed: 2-7-17  Social History/Home Situation: Lives with  at home . Work/Activity History:  2 days/ week. 8 hours /week. Would like to return to working out. OBJECTIVE:  Outcome Measure: Tool Used: Disabilities of the Arm, Shoulder and Hand (DASH) Questionnaire - Quick Version  Score:  Initial: 25/55  35/55 (Date: 11-1-16 ) Date: 1-6-17 33/55   Interpretation of Score: The DASH is designed to measure the activities of daily living in person's with upper extremity dysfunction or pain. Each section is scored on a 1-5 scale, 5 representing the greatest disability. The scores of each section are added together for a total score of 55. This number is divided by 11, followed by subtracting 1 and multiplying by 25 to get a percent score of disability. This value represents the percentage disability: 0-20% minimal disability; 20-40% moderate disability; 40-60% severe disability; % dependent for care or exaggerated symptom behavior. Minimal detectable change is 12%. Observation/Orthostatic Postural Assessment: not changed. Palpation: n/t  ROM: n/t                          Strength: n/t                 TREATMENT:    (In addition to Assessment/Re-Assessment sessions the following treatments were rendered)  Therapeutic Exercise (15 Minutes ) for UE strengthening. Moderate verbal and tactile cues on scapula for correct alignment.     Date:  1-18-17 Date:  1-20-17 Date:  1-23-17 Date  1-25-17 Date  2-7-17   Activity/Exercise Parameters Parameters Parameters parameters parametes   Serratus punch 3#2x15 4#2x15 4# 2x15 4#2x15 5# 2x15   Side lying ER - 2#2x15 2#2x15 2# 2x15   Prone shoulder ext 2#2x10 2#2x15 3#2x15 3#2x15 3#2x15   Prone middle trap 1#2x15 1#2x15 2#2x10 2#2x10 2#2x15   Prone lower trap 1#2x15 1#2x15 2#2x10 2#2x10 2#2x15   Eccentric ER in supine  Yellow 2x10 - Red 2x10  Red 2x10   Prone ER 1#2x15 -      Scapular plane ER 1#2x15 - 2#2x15     Wall slides  Yellow 2x10 -      Wrist flexion and extension - - - 2# 2x10 ea    gripper - - - Blue 2x15        HEP: continue current HEP  Manual Therapy (Left Joint Mobilization Duration  Duration: 30 Minutes    ): for L shoulder ROM. PROM to physiological mobilizations to the L shoulder for ER at neutral, ER at 45 deg abduction, IR, abduction, and forward elevation. Posterior and inferior glenohumeral joint glides grade 2-3 in mid and end range motions for improved mobility. Pt supine and cervical side glides with arm in abduction and ER and elbow extended. Pt sitting and thoracic side glides and MET to ~t5 for stiffness. Therapeutic Modalities: none                                                                                               __________________________________________________________________________________  Treatment Assessment: Pt reports no numbness and tingling in the left hand after treatment or strengthening exercises. ROM seems to be progressing. Progression/Medical Necessity:   · Patient is expected to demonstrate progress in strength and range of motion to improved mobility. Compliance with Program/Exercises: yes per pt   Reason for Continuation of Services/Other Comments:  · Patient continues to require skilled intervention due to decreased ROM and UE strength. Recommendations/Intent for next treatment session: \"Treatment next visit will focus on L shoulder post-op\" ROM.     Total Treatment Duration:  45 minutes  PT Patient Time In/Time Out  Time In: 1450  Time Out: [address] [place], PT, DPT

## 2017-02-08 ENCOUNTER — HOSPITAL ENCOUNTER (OUTPATIENT)
Dept: PHYSICAL THERAPY | Age: 62
Discharge: HOME | End: 2017-02-08
Payer: COMMERCIAL

## 2017-02-08 PROCEDURE — 97140 MANUAL THERAPY 1/> REGIONS: CPT

## 2017-02-08 NOTE — PROGRESS NOTES
Les Palma   (EXR:2/85/7336) [address] [employer]  at  [facility] LISHA THOMAS  [address] [place], [address] [place] [address],[address], [address] [facility]  Phone: [phone] Fax: [phone]       Outpatient PHYSICAL THERAPY: Daily Note and Progress Report 2/8/2017  Fall Risk Score: 0 (? 5 = High Risk)  ICD-10: Treatment Diagnosis: Pain in left shoulder (M25.512); Bicipital tendinitis, left shoulder (M75.22); Complete rotator cuff tear or rupture of left shoulder, not specified as traumatic (L68.624)  REFERRING PHYSICIAN: Dalia Taylor MD MD Orders: Evaluate and treat, strengthening, ROM, full motion/full strength  Return Physician Appointment:1-11-17   MEDICAL/REFERRING DIAGNOSIS: s/p L shoulder EUA/ manipulation/ LAURENT/ scope ASD/ ADCR/ BT  DATE OF ONSET: 9-29-16   PRIOR LEVEL OF FUNCTION: independent with ADLs  PRECAUTIONS/ALLERGIES: codeine  ASSESSMENT:  ?????? ? ? This section established at most recent assessment??????????  Ms. Ania Hinds presents s/p L shoulder scope with mini open rotator cuff repair and bicep tenodesis with surgery 9-29-16. She had an examination and manipulation of left shoulder under anesthesia on 12-9-16. She continues to progress with L shoulder active and passive ROM. She is progressing with strength in L UE. She continues to report numbness and tingling in L hand. She will benefit from continuing skilled physical therapy to continue to progress functional mobility. PROBLEM LIST (Impairments causing functional limitations):  1. Post-op L shoulder pain and swelling  2. Decreased L shoulder ROM   3. Weakness L shoulder   GOALS: (Goals have been discussed and agreed upon with patient.)  SHORT-TERM FUNCTIONAL GOALS: Time Frame: 6 weeeks  1. Report no more than 2/10 intermittent pain to Left shoulder with compensatory use during basic functional activities. GOAL MET  2. Left shoulder PROM forward elevation greater than 140 degrees and external rotation greater than 60 degrees to progress into functional ranges.  GOAL MET  3. Demonstrate good Left shoulder isometric strength with manual testing to progress into strength phase. GOAL MET  4. Independent with initial HEP. GOAL MET  DISCHARGE GOALS:  Time Frame: 12 weeks   1. No more than 1/10 intermittent pain Left shoulder with return to normalized household and work activities, and score less than 20% on the DASH. ongoing  2. Left shoulder AROM forward elevation greater than 135 degrees, external rotation greater than 60 degrees, and strength to shoulder are grossly WNL's for safe use with normalized activities. MET for ROM, progressing with strength  1. Demonstrate good functional shoulder strength and endurance for return to normalized household and work activities. Progressing towards  2. Independent with advanced shoulder HEP for continued self-management. ongoing  REHABILITATION POTENTIAL FOR STATED GOALS: [facility] OF CARE:  INTERVENTIONS PLANNED: (Benefits and precautions of physical therapy have been discussed with the patient.)  1. Thermal and electric modalities, manual therapies for pain   2. Manual therapies, therapeutic exercises, HEP for ROM    3. Therapeutic exercises and HEP for strength  TREATMENT PLAN EFFECTIVE DATES: 12-30-16 to 2-24-17  FREQUENCY/DURATION: Follow patient 2 times a week for 12 weeks to address above goals. Pt increase to 3x/week if shoulder ROM decreases                        SUBJECTIVE:  Present Symptoms:  pt reports no change in her pain in her hand. History of Present Injury/Illness (Reason for Referral): Shoulder pain for over a year. She tried to work out on her own. Pain with lifting arm all the way up and reaching behind back prior to surgery. Does not remember an injury but was a  and carried everything in her left hand. Surgery Thursday 9-29-16 and stayed one night in the hospital. 2nd nerve block Friday morning and therapy in the hospital prior to be discharged.  She has been icing the shoulder and taking the pain medicine. Shoulder manipulation done 12-9-16. Dominant Side: right  Past Medical History: hysterectomy, 2 C-sections  Current Medications: sertraline HCL, hydro morphine, promethazine, ketorolac, temazepam, aleve, tylenol, lyrica   Date Last Reviewed: 2-7-17  Social History/Home Situation: Lives with  at home . Work/Activity History:  2 days/ week. 8 hours /week. Would like to return to working out. OBJECTIVE:  Outcome Measure: Tool Used: Disabilities of the Arm, Shoulder and Hand (DASH) Questionnaire - Quick Version  Score:  Initial: 25/55  35/55 (Date: 11-1-16 ) Date: 1-6-17 33/55   Interpretation of Score: The DASH is designed to measure the activities of daily living in person's with upper extremity dysfunction or pain. Each section is scored on a 1-5 scale, 5 representing the greatest disability. The scores of each section are added together for a total score of 55. This number is divided by 11, followed by subtracting 1 and multiplying by 25 to get a percent score of disability. This value represents the percentage disability: 0-20% minimal disability; 20-40% moderate disability; 40-60% severe disability; % dependent for care or exaggerated symptom behavior. Minimal detectable change is 12%. Observation/Orthostatic Postural Assessment: not changed. Palpation: n/t  ROM:      LUE AROM  L Shoulder Flexion: 140  L Shoulder Internal Rotation:  (T10 behind back)  L Shoulder External Rotation: 60                    Strength:   LUE Strength  L Shoulder Flexion: 4+  L Shoulder ABduction: 4-  L Shoulder Internal Rotation: 4+  L Shoulder External Rotation: 4+              TREATMENT:    (In addition to Assessment/Re-Assessment sessions the following treatments were rendered)  Therapeutic Exercise (  ) NONE today  for UE strengthening. Moderate verbal and tactile cues on scapula for correct alignment.     Date:  1-18-17 Date:  1-20-17 Date:  1-23-17 Date  1-25-17 Date  2-7-17   Activity/Exercise Parameters Parameters Parameters parameters parametes   Serratus punch 3#2x15 4#2x15 4# 2x15 4#2x15 5# 2x15   Side lying ER -  2#2x15 2#2x15 2# 2x15   Prone shoulder ext 2#2x10 2#2x15 3#2x15 3#2x15 3#2x15   Prone middle trap 1#2x15 1#2x15 2#2x10 2#2x10 2#2x15   Prone lower trap 1#2x15 1#2x15 2#2x10 2#2x10 2#2x15   Eccentric ER in supine  Yellow 2x10 - Red 2x10  Red 2x10   Prone ER 1#2x15 -      Scapular plane ER 1#2x15 - 2#2x15     Wall slides  Yellow 2x10 -      Wrist flexion and extension - - - 2# 2x10 ea    gripper - - - Blue 2x15        HEP: continue current HEP  Manual Therapy (Left Joint Mobilization Duration  Duration: 40 Minutes    ): for L shoulder ROM. PROM to physiological mobilizations to the L shoulder for ER at neutral, ER at 45 deg abduction, IR, abduction, and forward elevation. Posterior and inferior glenohumeral joint glides grade 2-3 in mid and end range motions for improved mobility. Pt supine and cervical side glides with arm in abduction and ER and elbow extended. Pt sitting and thoracic side glides and MET to ~t5 for stiffness. Pt supine and grade 4 AP glide to ~T4. Therapeutic Modalities: none                                                                                               __________________________________________________________________________________  Treatment Assessment: Improved L shoulder AROM today. She is progressing with strength in L UE. Progression/Medical Necessity:   · Patient is expected to demonstrate progress in strength and range of motion to improved mobility. Compliance with Program/Exercises: yes per pt   Reason for Continuation of Services/Other Comments:  · Patient continues to require skilled intervention due to decreased ROM and UE strength. Recommendations/Intent for next treatment session: \"Treatment next visit will focus on L shoulder post-op\" ROM.     Total Treatment Duration:  40 minutes  PT Patient Time In/Time Out  Time In: 1020  Time Out: Jill 112, PT, DPT

## 2017-02-10 ENCOUNTER — APPOINTMENT (OUTPATIENT)
Dept: PHYSICAL THERAPY | Age: 62
End: 2017-02-10
Payer: COMMERCIAL

## 2017-02-13 ENCOUNTER — HOSPITAL ENCOUNTER (OUTPATIENT)
Dept: PHYSICAL THERAPY | Age: 62
Discharge: HOME | End: 2017-02-13
Payer: COMMERCIAL

## 2017-02-13 PROCEDURE — 97140 MANUAL THERAPY 1/> REGIONS: CPT

## 2017-02-13 PROCEDURE — 97110 THERAPEUTIC EXERCISES: CPT

## 2017-02-13 NOTE — PROGRESS NOTES
Ivana Mcadams   (GKZ:4/57/4746) [address] [place] [place] [address]  at  [facility] LISHA THOMAS  [address] [facility], [address], [facility]. .  Phone: [phone] Fax: [phone]       Outpatient PHYSICAL THERAPY: Daily Note 2/13/2017  Fall Risk Score: 0 (? 5 = High Risk)  ICD-10: Treatment Diagnosis: Pain in left shoulder (M25.512); Bicipital tendinitis, left shoulder (M75.22); Complete rotator cuff tear or rupture of left shoulder, not specified as traumatic (F10.904)  REFERRING PHYSICIAN: Tanner Putnam MD MD Orders: Evaluate and treat, strengthening, ROM, full motion/full strength  Return Physician Appointment:1-11-17   MEDICAL/REFERRING DIAGNOSIS: s/p L shoulder EUA/ manipulation/ LAURENT/ scope ASD/ ADCR/ BT  DATE OF ONSET: 9-29-16   PRIOR LEVEL OF FUNCTION: independent with ADLs  PRECAUTIONS/ALLERGIES: codeine  ASSESSMENT:  ?????? ? ? This section established at most recent assessment??????????  Ms. Jorge Hubbard presents s/p L shoulder scope with mini open rotator cuff repair and bicep tenodesis with surgery 9-29-16. She had an examination and manipulation of left shoulder under anesthesia on 12-9-16. She continues to progress with L shoulder active and passive ROM. She is progressing with strength in L UE. She continues to report numbness and tingling in L hand. She will benefit from continuing skilled physical therapy to continue to progress functional mobility. PROBLEM LIST (Impairments causing functional limitations):  1. Post-op L shoulder pain and swelling  2. Decreased L shoulder ROM   3. Weakness L shoulder   GOALS: (Goals have been discussed and agreed upon with patient.)  SHORT-TERM FUNCTIONAL GOALS: Time Frame: 6 weeeks  1. Report no more than 2/10 intermittent pain to Left shoulder with compensatory use during basic functional activities. GOAL MET  2. Left shoulder PROM forward elevation greater than 140 degrees and external rotation greater than 60 degrees to progress into functional ranges. GOAL MET  3.  Demonstrate good Left shoulder isometric strength with manual testing to progress into strength phase. GOAL MET  4. Independent with initial HEP. GOAL MET  DISCHARGE GOALS:  Time Frame: 12 weeks   1. No more than 1/10 intermittent pain Left shoulder with return to normalized household and work activities, and score less than 20% on the DASH. ongoing  2. Left shoulder AROM forward elevation greater than 135 degrees, external rotation greater than 60 degrees, and strength to shoulder are grossly WNL's for safe use with normalized activities. MET for ROM, progressing with strength  1. Demonstrate good functional shoulder strength and endurance for return to normalized household and work activities. Progressing towards  2. Independent with advanced shoulder HEP for continued self-management. ongoing  REHABILITATION POTENTIAL FOR STATED GOALS: [facility] OF CARE:  INTERVENTIONS PLANNED: (Benefits and precautions of physical therapy have been discussed with the patient.)  1. Thermal and electric modalities, manual therapies for pain   2. Manual therapies, therapeutic exercises, HEP for ROM    3. Therapeutic exercises and HEP for strength  TREATMENT PLAN EFFECTIVE DATES: 12-30-16 to 2-24-17  FREQUENCY/DURATION: Follow patient 2 times a week for 12 weeks to address above goals. Pt increase to 3x/week if shoulder ROM decreases                        SUBJECTIVE:  Present Symptoms:  pt reports her hand is the worst in the morning. History of Present Injury/Illness (Reason for Referral): Shoulder pain for over a year. She tried to work out on her own. Pain with lifting arm all the way up and reaching behind back prior to surgery. Does not remember an injury but was a  and carried everything in her left hand. Surgery Thursday 9-29-16 and stayed one night in the hospital. 2nd nerve block Friday morning and therapy in the hospital prior to be discharged. She has been icing the shoulder and taking the pain medicine.  Shoulder manipulation done 12-9-16. Dominant Side: right  Past Medical History: hysterectomy, 2 C-sections  Current Medications: sertraline HCL, hydro morphine, promethazine, ketorolac, temazepam, aleve, tylenol, lyrica   Date Last Reviewed: 2-7-17  Social History/Home Situation: Lives with  at home . Work/Activity History:  2 days/ week. 8 hours /week. Would like to return to working out. OBJECTIVE:  Outcome Measure: Tool Used: Disabilities of the Arm, Shoulder and Hand (DASH) Questionnaire - Quick Version  Score:  Initial: 25/55 35/55 (Date: 11-1-16 ) Date: 1-6-17 33/55   Interpretation of Score: The DASH is designed to measure the activities of daily living in person's with upper extremity dysfunction or pain. Each section is scored on a 1-5 scale, 5 representing the greatest disability. The scores of each section are added together for a total score of 55. This number is divided by 11, followed by subtracting 1 and multiplying by 25 to get a percent score of disability. This value represents the percentage disability: 0-20% minimal disability; 20-40% moderate disability; 40-60% severe disability; % dependent for care or exaggerated symptom behavior. Minimal detectable change is 12%. Observation/Orthostatic Postural Assessment: not changed. Palpation: n/t  ROM:                           Strength:                  TREATMENT:    (In addition to Assessment/Re-Assessment sessions the following treatments were rendered)  Therapeutic Exercise (15 Minutes )   for UE strengthening. Moderate verbal and tactile cues on scapula for correct alignment.     Date:  1-18-17 Date:  1-20-17 Date:  1-23-17 Date  1-25-17 Date  2-7-17 Date  2-13-17   Activity/Exercise Parameters Parameters Parameters parameters parametes parameters   Serratus punch 3#2x15 4#2x15 4# 2x15 4#2x15 5# 2x15 Red band 2x10   Side lying ER -  2#2x15 2#2x15 2# 2x15    Prone shoulder ext 2#2x10 2#2x15 3#2x15 3#2x15 3#2x15 Green 2x15   Prone middle trap 1#2x15 1#2x15 2#2x10 2#2x10 2#2x15 Red 2x10   Prone lower trap 1#2x15 1#2x15 2#2x10 2#2x10 2#2x15 Wall Y 20x with lift off   Eccentric ER in supine  Yellow 2x10 - Red 2x10  Red 2x10    Prone ER 1#2x15 -       Scapular plane ER 1#2x15 - 2#2x15      Wall slides  Yellow 2x10 -       Wrist flexion and extension - - - 2# 2x10 ea     gripper - - - Blue 2x15         HEP: continue current HEP  Manual Therapy (Left Joint Mobilization Duration  Duration: 30 Minutes    ): for L shoulder ROM. PROM to physiological mobilizations to the L shoulder for ER at neutral, ER at 45 deg abduction, IR, abduction, and forward elevation. Posterior and inferior glenohumeral joint glides grade 2-3 in mid and end range motions for improved mobility. Pt supine and cervical side glides with arm in abduction and ER and elbow extended. Therapeutic Modalities: none                                                                                               __________________________________________________________________________________  Treatment Assessment: She had difficulty with new standing therband exercises today. Progression/Medical Necessity:   · Patient is expected to demonstrate progress in strength and range of motion to improved mobility. Compliance with Program/Exercises: yes per pt   Reason for Continuation of Services/Other Comments:  · Patient continues to require skilled intervention due to decreased ROM and UE strength. Recommendations/Intent for next treatment session: \"Treatment next visit will focus on L shoulder post-op\" ROM.     Total Treatment Duration:  45 minutes  PT Patient Time In/Time Out  Time In: 1525  Time Out: [address], PT, DPT

## 2017-02-15 ENCOUNTER — HOSPITAL ENCOUNTER (OUTPATIENT)
Dept: PHYSICAL THERAPY | Age: 62
Discharge: HOME | End: 2017-02-15
Payer: COMMERCIAL

## 2017-02-15 PROCEDURE — 97140 MANUAL THERAPY 1/> REGIONS: CPT

## 2017-02-15 NOTE — PROGRESS NOTES
Ange Alessandrosara   (KEN:5/95/[id number]) [address]  at  [facility] LISHA THOMAS  [address] [facility], [address], [address] [facility]  Phone: [phone] Fax: [phone]       Outpatient PHYSICAL THERAPY: Daily Note 2/15/2017  Fall Risk Score: 0 (? 5 = High Risk)  ICD-10: Treatment Diagnosis: Pain in left shoulder (M25.512); Bicipital tendinitis, left shoulder (M75.22); Complete rotator cuff tear or rupture of left shoulder, not specified as traumatic (I65.064)  REFERRING PHYSICIAN: Patience Cook MD MD Orders: Evaluate and treat, strengthening, ROM, full motion/full strength  Return Physician Appointment:1-11-17   MEDICAL/REFERRING DIAGNOSIS: s/p L shoulder EUA/ manipulation/ LAURENT/ scope ASD/ ADCR/ BT  DATE OF ONSET: 9-29-16   PRIOR LEVEL OF FUNCTION: independent with ADLs  PRECAUTIONS/ALLERGIES: codeine  ASSESSMENT:  ?????? ? ? This section established at most recent assessment??????????  Ms. Pineda Armstrong presents s/p L shoulder scope with mini open rotator cuff repair and bicep tenodesis with surgery 9-29-16. She had an examination and manipulation of left shoulder under anesthesia on 12-9-16. She continues to progress with L shoulder active and passive ROM. She is progressing with strength in L UE. She continues to report numbness and tingling in L hand. She will benefit from continuing skilled physical therapy to continue to progress functional mobility. PROBLEM LIST (Impairments causing functional limitations):  1. Post-op L shoulder pain and swelling  2. Decreased L shoulder ROM   3. Weakness L shoulder   GOALS: (Goals have been discussed and agreed upon with patient.)  SHORT-TERM FUNCTIONAL GOALS: Time Frame: 6 weeeks  1. Report no more than 2/10 intermittent pain to Left shoulder with compensatory use during basic functional activities. GOAL MET  2. Left shoulder PROM forward elevation greater than 140 degrees and external rotation greater than 60 degrees to progress into functional ranges. GOAL MET  3.  Demonstrate good Left shoulder isometric strength with manual testing to progress into strength phase. GOAL MET  4. Independent with initial HEP. GOAL MET  DISCHARGE GOALS:  Time Frame: 12 weeks   1. No more than 1/10 intermittent pain Left shoulder with return to normalized household and work activities, and score less than 20% on the DASH. ongoing  2. Left shoulder AROM forward elevation greater than 135 degrees, external rotation greater than 60 degrees, and strength to shoulder are grossly WNL's for safe use with normalized activities. MET for ROM, progressing with strength  1. Demonstrate good functional shoulder strength and endurance for return to normalized household and work activities. Progressing towards  2. Independent with advanced shoulder HEP for continued self-management. ongoing  REHABILITATION POTENTIAL FOR STATED GOALS: Chepe Tompkins OF CARE:  INTERVENTIONS PLANNED: (Benefits and precautions of physical therapy have been discussed with the patient.)  1. Thermal and electric modalities, manual therapies for pain   2. Manual therapies, therapeutic exercises, HEP for ROM    3. Therapeutic exercises and HEP for strength  TREATMENT PLAN EFFECTIVE DATES: 12-30-16 to 2-24-17  FREQUENCY/DURATION: Follow patient 2 times a week for 12 weeks to address above goals. Pt increase to 3x/week if shoulder ROM decreases                        SUBJECTIVE:  Present Symptoms:  pt reports her hand bothers her the most.   History of Present Injury/Illness (Reason for Referral): Shoulder pain for over a year. She tried to work out on her own. Pain with lifting arm all the way up and reaching behind back prior to surgery. Does not remember an injury but was a  and carried everything in her left hand. Surgery Thursday 9-29-16 and stayed one night in the hospital. 2nd nerve block Friday morning and therapy in the hospital prior to be discharged. She has been icing the shoulder and taking the pain medicine.  Shoulder manipulation done 12-9-16. Dominant Side: right  Past Medical History: hysterectomy, 2 C-sections  Current Medications: sertraline HCL, hydro morphine, promethazine, ketorolac, temazepam, aleve, tylenol, lyrica   Date Last Reviewed: 2-15-17  Social History/Home Situation: Lives with  at home . Work/Activity History:  2 days/ week. 8 hours /week. Would like to return to working out. OBJECTIVE:  Outcome Measure: Tool Used: Disabilities of the Arm, Shoulder and Hand (DASH) Questionnaire - Quick Version  Score:  Initial: 25/55  35/55 (Date: 11-1-16 ) Date: 1-6-17 33/55   Interpretation of Score: The DASH is designed to measure the activities of daily living in person's with upper extremity dysfunction or pain. Each section is scored on a 1-5 scale, 5 representing the greatest disability. The scores of each section are added together for a total score of 55. This number is divided by 11, followed by subtracting 1 and multiplying by 25 to get a percent score of disability. This value represents the percentage disability: 0-20% minimal disability; 20-40% moderate disability; 40-60% severe disability; % dependent for care or exaggerated symptom behavior. Minimal detectable change is 12%. Observation/Orthostatic Postural Assessment: not changed. Palpation: n/t  ROM:                           Strength:                  TREATMENT:    (In addition to Assessment/Re-Assessment sessions the following treatments were rendered)  Therapeutic Exercise (  )   for UE strengthening. Moderate verbal and tactile cues on scapula for correct alignment.     Date:  1-18-17 Date:  1-20-17 Date:  1-23-17 Date  1-25-17 Date  2-7-17 Date  2-13-17   Activity/Exercise Parameters Parameters Parameters parameters parametes parameters   Serratus punch 3#2x15 4#2x15 4# 2x15 4#2x15 5# 2x15 Red band 2x10   Side lying ER -  2#2x15 2#2x15 2# 2x15    Prone shoulder ext 2#2x10 2#2x15 3#2x15 3#2x15 3#2x15 Green 2x15 Prone middle trap 1#2x15 1#2x15 2#2x10 2#2x10 2#2x15 Red 2x10   Prone lower trap 1#2x15 1#2x15 2#2x10 2#2x10 2#2x15 Wall Y 20x with lift off   Eccentric ER in supine  Yellow 2x10 - Red 2x10  Red 2x10    Prone ER 1#2x15 -       Scapular plane ER 1#2x15 - 2#2x15      Wall slides  Yellow 2x10 -       Wrist flexion and extension - - - 2# 2x10 ea     gripper - - - Blue 2x15         HEP: continue current HEP  Manual Therapy (Left Joint Mobilization Duration  Duration: 45 Minutes    ): for L shoulder ROM. PROM to physiological mobilizations to the L shoulder for ER at neutral, ER at 45 deg abduction, IR, abduction, and forward elevation. Posterior and inferior glenohumeral joint glides grade 2-3 in mid and end range motions for improved mobility. Pt supine and cervical side glides with arm in abduction and ER and elbow extended. Soft tissue mobilization to anterior left shoulder and pec major. Shoulder horizontal abduction for stretching anterior shoulder. Median nerve glides with mobilizing at the hand and then with patient performing lateral trunk rotation. Educated in home nerve gliding to try. Therapeutic Modalities: none                                                                                               __________________________________________________________________________________  Treatment Assessment: Pt reported slight improvement in hand pain at end of manual treatment. She is very tight to left anterior shoulder. Progression/Medical Necessity:   · Patient is expected to demonstrate progress in strength and range of motion to improved mobility. Compliance with Program/Exercises: yes per pt   Reason for Continuation of Services/Other Comments:  · Patient continues to require skilled intervention due to decreased ROM and UE strength. Recommendations/Intent for next treatment session: \"Treatment next visit will focus on L shoulder post-op\" ROM.     Total Treatment Duration:  45 minutes  PT Patient Time In/Time Out  Time In: 1875  Time Out: [address], PT, DPT

## 2017-02-17 ENCOUNTER — APPOINTMENT (OUTPATIENT)
Dept: PHYSICAL THERAPY | Age: 62
End: 2017-02-17
Payer: COMMERCIAL

## 2017-02-20 ENCOUNTER — HOSPITAL ENCOUNTER (OUTPATIENT)
Dept: PHYSICAL THERAPY | Age: 62
Discharge: HOME | End: 2017-02-20
Payer: COMMERCIAL

## 2017-02-20 PROCEDURE — 97110 THERAPEUTIC EXERCISES: CPT

## 2017-02-20 PROCEDURE — 97140 MANUAL THERAPY 1/> REGIONS: CPT

## 2017-02-20 NOTE — PROGRESS NOTES
Radha Escobar   (TOW:6/25/[birth year]133) [address]  at  [facility] LISHA THOMAS  [address] [facility], [address] [place] [address],[address], [facility] [address].  Phone: [phone] Fax: [phone]       Outpatient PHYSICAL THERAPY: Daily Note 2/20/2017  Fall Risk Score: 0 (? 5 = High Risk)  ICD-10: Treatment Diagnosis: Pain in left shoulder (M25.512); Bicipital tendinitis, left shoulder (M75.22); Complete rotator cuff tear or rupture of left shoulder, not specified as traumatic (S25.235)  REFERRING PHYSICIAN: Rock Steele MD MD Orders: Evaluate and treat, strengthening, ROM, full motion/full strength  Return Physician Appointment:1-11-17   MEDICAL/REFERRING DIAGNOSIS: s/p L shoulder EUA/ manipulation/ LAURENT/ scope ASD/ ADCR/ BT  DATE OF ONSET: 9-29-16   PRIOR LEVEL OF FUNCTION: independent with ADLs  PRECAUTIONS/ALLERGIES: codeine  ASSESSMENT:  ?????? ? ? This section established at most recent assessment??????????  Ms. Barrie Schilder presents s/p L shoulder scope with mini open rotator cuff repair and bicep tenodesis with surgery 9-29-16. She had an examination and manipulation of left shoulder under anesthesia on 12-9-16. She continues to progress with L shoulder active and passive ROM. She is progressing with strength in L UE. She continues to report numbness and tingling in L hand. She will benefit from continuing skilled physical therapy to continue to progress functional mobility. PROBLEM LIST (Impairments causing functional limitations):  1. Post-op L shoulder pain and swelling  2. Decreased L shoulder ROM   3. Weakness L shoulder   GOALS: (Goals have been discussed and agreed upon with patient.)  SHORT-TERM FUNCTIONAL GOALS: Time Frame: 6 weeeks  1. Report no more than 2/10 intermittent pain to Left shoulder with compensatory use during basic functional activities. GOAL MET  2. Left shoulder PROM forward elevation greater than 140 degrees and external rotation greater than 60 degrees to progress into functional ranges. GOAL MET  3.  Demonstrate good Left shoulder isometric strength with manual testing to progress into strength phase. GOAL MET  4. Independent with initial HEP. GOAL MET  DISCHARGE GOALS:  Time Frame: 12 weeks   1. No more than 1/10 intermittent pain Left shoulder with return to normalized household and work activities, and score less than 20% on the DASH. ongoing  2. Left shoulder AROM forward elevation greater than 135 degrees, external rotation greater than 60 degrees, and strength to shoulder are grossly WNL's for safe use with normalized activities. MET for ROM, progressing with strength  1. Demonstrate good functional shoulder strength and endurance for return to normalized household and work activities. Progressing towards  2. Independent with advanced shoulder HEP for continued self-management. ongoing  REHABILITATION POTENTIAL FOR STATED GOALS: [place] OF CARE:  INTERVENTIONS PLANNED: (Benefits and precautions of physical therapy have been discussed with the patient.)  1. Thermal and electric modalities, manual therapies for pain   2. Manual therapies, therapeutic exercises, HEP for ROM    3. Therapeutic exercises and HEP for strength  TREATMENT PLAN EFFECTIVE DATES: 12-30-16 to 2-24-17  FREQUENCY/DURATION: Follow patient 2 times a week for 12 weeks to address above goals. Pt increase to 3x/week if shoulder ROM decreases                        SUBJECTIVE:  Present Symptoms:  pt reports no change in hand pain. Pt reports her shoulder has been more sore the past few days. History of Present Injury/Illness (Reason for Referral): Shoulder pain for over a year. She tried to work out on her own. Pain with lifting arm all the way up and reaching behind back prior to surgery. Does not remember an injury but was a  and carried everything in her left hand. Surgery Thursday 9-29-16 and stayed one night in the hospital. 2nd nerve block Friday morning and therapy in the hospital prior to be discharged.  She has been icing the shoulder and taking the pain medicine. Shoulder manipulation done 12-9-16. Dominant Side: right  Past Medical History: hysterectomy, 2 C-sections  Current Medications: sertraline HCL, hydro morphine, promethazine, ketorolac, temazepam, aleve, tylenol, lyrica   Date Last Reviewed: 2-15-17  Social History/Home Situation: Lives with  at home . Work/Activity History:  2 days/ week. 8 hours /week. Would like to return to working out. OBJECTIVE:  Outcome Measure: Tool Used: Disabilities of the Arm, Shoulder and Hand (DASH) Questionnaire - Quick Version  Score:  Initial: 25/55  35/55 (Date: 11-1-16 ) Date: 1-6-17 33/55   Interpretation of Score: The DASH is designed to measure the activities of daily living in person's with upper extremity dysfunction or pain. Each section is scored on a 1-5 scale, 5 representing the greatest disability. The scores of each section are added together for a total score of 55. This number is divided by 11, followed by subtracting 1 and multiplying by 25 to get a percent score of disability. This value represents the percentage disability: 0-20% minimal disability; 20-40% moderate disability; 40-60% severe disability; % dependent for care or exaggerated symptom behavior. Minimal detectable change is 12%. Observation/Orthostatic Postural Assessment: not changed. Palpation: n/t  ROM:                           Strength:                  TREATMENT:    (In addition to Assessment/Re-Assessment sessions the following treatments were rendered)  Therapeutic Exercise (15 Minutes )   for UE strengthening. Moderate verbal and tactile cues on scapula for correct alignment.     Date:  1-18-17 Date:  1-20-17 Date:  1-23-17 Date  1-25-17 Date  2-7-17 Date  2-13-17 Date  2-20-17   Activity/Exercise Parameters Parameters Parameters parameters parametes parameters parameters   Serratus punch 3#2x15 4#2x15 4# 2x15 4#2x15 5# 2x15 Red band 2x10 5#2x15   Side lying ER -  2#2x15 2#2x15 2# 2x15  3#2x10   Prone shoulder ext 2#2x10 2#2x15 3#2x15 3#2x15 3#2x15 Green 2x15 Green 2x15   Prone middle trap 1#2x15 1#2x15 2#2x10 2#2x10 2#2x15 Red 2x10 Red 2x10   Prone lower trap 1#2x15 1#2x15 2#2x10 2#2x10 2#2x15 Wall Y 20x with lift off Wall Y with red band 2x10   Eccentric ER in supine  Yellow 2x10 - Red 2x10  Red 2x10     Prone ER 1#2x15 -        Scapular plane ER 1#2x15 - 2#2x15       Wall slides  Yellow 2x10 -        Wrist flexion and extension - - - 2# 2x10 ea      gripper - - - Blue 2x15          HEP: continue current HEP  Manual Therapy (Left Joint Mobilization Duration  Duration: 30 Minutes    ): for L shoulder ROM. PROM to physiological mobilizations to the L shoulder for ER at neutral, ER at 45 deg abduction, IR, abduction, and forward elevation. Posterior and inferior glenohumeral joint glides grade 2-3 in mid and end range motions for improved mobility. Pt supine and cervical side glides with arm in abduction and ER and elbow extended. Soft tissue mobilization to anterior left shoulder and pec major. Shoulder horizontal abduction for stretching anterior shoulder. Therapeutic Modalities: none                                                                                               __________________________________________________________________________________  Treatment Assessment: Continues with tightness to L anterior shoulder. She did well with strengthening exercises. Progression/Medical Necessity:   · Patient is expected to demonstrate progress in strength and range of motion to improved mobility. Compliance with Program/Exercises: yes per pt   Reason for Continuation of Services/Other Comments:  · Patient continues to require skilled intervention due to decreased ROM and UE strength. Recommendations/Intent for next treatment session: \"Treatment next visit will focus on L shoulder post-op\" ROM.     Total Treatment Duration:  45 minutes  PT Patient Time In/Time Out  Time In: 1520  Time Out: 45 Nava Mcdowell, PT, DPT

## 2017-02-22 ENCOUNTER — APPOINTMENT (OUTPATIENT)
Dept: PHYSICAL THERAPY | Age: 62
End: 2017-02-22
Payer: COMMERCIAL

## 2017-02-23 ENCOUNTER — HOSPITAL ENCOUNTER (OUTPATIENT)
Dept: PHYSICAL THERAPY | Age: 62
Discharge: HOME | End: 2017-02-23
Payer: COMMERCIAL

## 2017-02-23 PROCEDURE — 97110 THERAPEUTIC EXERCISES: CPT

## 2017-02-23 PROCEDURE — 97140 MANUAL THERAPY 1/> REGIONS: CPT

## 2017-02-23 NOTE — PROGRESS NOTES
Santosh Carranza   ([facility]:8/07/4270) [address]  at  [facility] LISHA THOMAS  [address] [facility], [address], [place] [address].  Phone: [phone] Fax: [phone]       Outpatient PHYSICAL THERAPY: Daily Note 2/23/2017  Fall Risk Score: 0 (? 5 = High Risk)  ICD-10: Treatment Diagnosis: Pain in left shoulder (M25.512); Bicipital tendinitis, left shoulder (M75.22); Complete rotator cuff tear or rupture of left shoulder, not specified as traumatic (U60.030)  REFERRING PHYSICIAN: Charlotte Awan MD MD Orders: Evaluate and treat, strengthening, ROM, full motion/full strength  Return Physician Appointment:1-11-17   MEDICAL/REFERRING DIAGNOSIS: s/p L shoulder EUA/ manipulation/ LAURENT/ scope ASD/ ADCR/ BT  DATE OF ONSET: 9-29-16   PRIOR LEVEL OF FUNCTION: independent with ADLs  PRECAUTIONS/ALLERGIES: codeine  ASSESSMENT:  ?????? ? ? This section established at most recent assessment??????????  Ms. Suma Rosenbaum presents s/p L shoulder scope with mini open rotator cuff repair and bicep tenodesis with surgery 9-29-16. She had an examination and manipulation of left shoulder under anesthesia on 12-9-16. She continues to progress with L shoulder active and passive ROM. She is progressing with strength in L UE. She continues to report numbness and tingling in L hand. She will benefit from continuing skilled physical therapy to continue to progress functional mobility. PROBLEM LIST (Impairments causing functional limitations):  1. Post-op L shoulder pain and swelling  2. Decreased L shoulder ROM   3. Weakness L shoulder   GOALS: (Goals have been discussed and agreed upon with patient.)  SHORT-TERM FUNCTIONAL GOALS: Time Frame: 6 weeeks  1. Report no more than 2/10 intermittent pain to Left shoulder with compensatory use during basic functional activities. GOAL MET  2. Left shoulder PROM forward elevation greater than 140 degrees and external rotation greater than 60 degrees to progress into functional ranges. GOAL MET  3.  Demonstrate good Left shoulder isometric strength with manual testing to progress into strength phase. GOAL MET  4. Independent with initial HEP. GOAL MET  DISCHARGE GOALS:  Time Frame: 12 weeks   1. No more than 1/10 intermittent pain Left shoulder with return to normalized household and work activities, and score less than 20% on the DASH. ongoing  2. Left shoulder AROM forward elevation greater than 135 degrees, external rotation greater than 60 degrees, and strength to shoulder are grossly WNL's for safe use with normalized activities. MET for ROM, progressing with strength  1. Demonstrate good functional shoulder strength and endurance for return to normalized household and work activities. Progressing towards  2. Independent with advanced shoulder HEP for continued self-management. ongoing  REHABILITATION POTENTIAL FOR STATED GOALS: Calvin Morrow OF CARE:  INTERVENTIONS PLANNED: (Benefits and precautions of physical therapy have been discussed with the patient.)  1. Thermal and electric modalities, manual therapies for pain   2. Manual therapies, therapeutic exercises, HEP for ROM    3. Therapeutic exercises and HEP for strength  TREATMENT PLAN EFFECTIVE DATES: 12-30-16 to 2-24-17  FREQUENCY/DURATION: Follow patient 2 times a week for 12 weeks to address above goals. Pt increase to 3x/week if shoulder ROM decreases                        SUBJECTIVE:  Present Symptoms:  pt reports she seems MD next week and has a lot of questions for him. History of Present Injury/Illness (Reason for Referral): Shoulder pain for over a year. She tried to work out on her own. Pain with lifting arm all the way up and reaching behind back prior to surgery. Does not remember an injury but was a  and carried everything in her left hand. Surgery Thursday 9-29-16 and stayed one night in the hospital. 2nd nerve block Friday morning and therapy in the hospital prior to be discharged.  She has been icing the shoulder and taking the pain medicine. Shoulder manipulation done 12-9-16. Dominant Side: right  Past Medical History: hysterectomy, 2 C-sections  Current Medications: sertraline HCL, hydro morphine, promethazine, ketorolac, temazepam, aleve, tylenol, lyrica   Date Last Reviewed: 2-23-17  Social History/Home Situation: Lives with  at home . Work/Activity History:  2 days/ week. 8 hours /week. Would like to return to working out. OBJECTIVE:  Outcome Measure: Tool Used: Disabilities of the Arm, Shoulder and Hand (DASH) Questionnaire - Quick Version  Score:  Initial: 25/55 35/55 (Date: 11-1-16 ) Date: 1-6-17 33/55   Interpretation of Score: The DASH is designed to measure the activities of daily living in person's with upper extremity dysfunction or pain. Each section is scored on a 1-5 scale, 5 representing the greatest disability. The scores of each section are added together for a total score of 55. This number is divided by 11, followed by subtracting 1 and multiplying by 25 to get a percent score of disability. This value represents the percentage disability: 0-20% minimal disability; 20-40% moderate disability; 40-60% severe disability; % dependent for care or exaggerated symptom behavior. Minimal detectable change is 12%. Observation/Orthostatic Postural Assessment: not changed. Palpation: n/t  ROM:                           Strength:                  TREATMENT:    (In addition to Assessment/Re-Assessment sessions the following treatments were rendered)  Therapeutic Exercise (15 Minutes )   for UE strengthening. Moderate verbal and tactile cues on scapula for correct alignment.     Date:  1-23-17 Date  1-25-17 Date  2-7-17 Date  2-13-17 Date  2-20-17 Date  2-23-17   Activity/Exercise Parameters parameters parametes parameters parameters parameters   Serratus punch 4# 2x15 4#2x15 5# 2x15 Red band 2x10 5#2x15 6#2x15   Side lying ER 2#2x15 2#2x15 2# 2x15  3#2x10    Prone shoulder ext 3#2x15 3#2x15 3#2x15 Green 2x15 Green 2x15    Prone middle trap 2#2x10 2#2x10 2#2x15 Red 2x10 Red 2x10 Prone B 2x15   Prone lower trap 2#2x10 2#2x10 2#2x15 Wall Y 20x with lift off Wall Y with red band 2x10 Prone B 2x15   Eccentric ER in supine  Red 2x10  Red 2x10      Prone ER         Scapular plane ER 2#2x15     Standing against half roll 2x10   Wall slides          Wrist flexion and extension - 2# 2x10 ea       gripper - Blue 2x15           HEP: continue current HEP  Manual Therapy (Left Joint Mobilization Duration  Duration: 30 Minutes    ): for L shoulder ROM. PROM to physiological mobilizations to the L shoulder for ER at neutral, ER at 45 deg abduction, IR, abduction, and forward elevation. Posterior and inferior glenohumeral joint glides grade 2-3 in mid and end range motions for improved mobility. Pt supine and cervical side glides with arm in abduction and ER and elbow extended. Soft tissue mobilization to anterior left shoulder and pec major. Shoulder horizontal abduction for stretching anterior shoulder. Therapeutic Modalities: none                                                                                               __________________________________________________________________________________  Treatment Assessment: Continues with tightness to L anterior shoulder and reports increased discomfort in her thumb with stretching. Progression/Medical Necessity:   · Patient is expected to demonstrate progress in strength and range of motion to improved mobility. Compliance with Program/Exercises: yes per pt   Reason for Continuation of Services/Other Comments:  · Patient continues to require skilled intervention due to decreased ROM and UE strength. Recommendations/Intent for next treatment session: \"Treatment next visit will focus on L shoulder post-op\" ROM.     Total Treatment Duration:  45 minutes  PT Patient Time In/Time Out  Time In: 1300  Time Out: [address] [facility] DPT

## 2017-02-24 ENCOUNTER — APPOINTMENT (OUTPATIENT)
Dept: PHYSICAL THERAPY | Age: 62
End: 2017-02-24
Payer: COMMERCIAL

## 2017-05-03 NOTE — PROGRESS NOTES
Edilson Bhat   ([id number]:[id number]) [address]  at  [facility] LISHA THOMAS  [facility], [address] [place] [address],[address], [facility] [address]  Phone: [phone] Fax: [phone]       Outpatient PHYSICAL THERAPY: Discharge 2/23/2017  Fall Risk Score: 0 (? 5 = High Risk)  ICD-10: Treatment Diagnosis: Pain in left shoulder (M25.512); Bicipital tendinitis, left shoulder (M75.22); Complete rotator cuff tear or rupture of left shoulder, not specified as traumatic (S31.805)  REFERRING PHYSICIAN: Raymond Donald MD  MEDICAL/REFERRING DIAGNOSIS: s/p L shoulder EUA/ manipulation/ LAURENT/ scope ASD/ ADCR/ BT  DATE OF ONSET: 9-29-16   Ms. Anthony attended 45 physical therapy sessions from 10-3-16 to 2-23-17. ASSESSMENT:  ?????? ? ? This section established at most recent assessment??????????  Ms. Ana Luisa Payton presents s/p L shoulder scope with mini open rotator cuff repair and bicep tenodesis with surgery 9-29-16. She had an examination and manipulation of left shoulder under anesthesia on 12-9-16. She progressed with L shoulder ROM and UE strength. She continued to report numbness and tingling in L hand. She was ready to continue exercises on her own at home. PROBLEM LIST (Impairments causing functional limitations):  1. Post-op L shoulder pain and swelling  2. Decreased L shoulder ROM   3. Weakness L shoulder   GOALS: (Goals have been discussed and agreed upon with patient.)  SHORT-TERM FUNCTIONAL GOALS: Time Frame: 6 weeeks  1. Report no more than 2/10 intermittent pain to Left shoulder with compensatory use during basic functional activities. GOAL MET  2. Left shoulder PROM forward elevation greater than 140 degrees and external rotation greater than 60 degrees to progress into functional ranges. GOAL MET  3. Demonstrate good Left shoulder isometric strength with manual testing to progress into strength phase. GOAL MET  4. Independent with initial HEP. GOAL MET  DISCHARGE GOALS:  Time Frame: 12 weeks   1.  No more than 1/10 intermittent pain Left shoulder with return to normalized household and work activities, and score less than 20% on the DASH. Progressed towards  2. Left shoulder AROM forward elevation greater than 135 degrees, external rotation greater than 60 degrees, and strength to shoulder are grossly WNL's for safe use with normalized activities. MET for ROM, progressed with strength  1. Demonstrate good functional shoulder strength and endurance for return to normalized household and work activities. Progressed towards  2. Independent with advanced shoulder HEP for continued self-management. GOAL MET  REHABILITATION POTENTIAL FOR STATED GOALS: GoodPLAN OF CARE:  Discharge patient from physical therapy. Thank you for this referral,   Cass Hsu, PT                        SUBJECTIVE:  History of Present Injury/Illness (Reason for Referral): Shoulder pain for over a year. She tried to work out on her own. Pain with lifting arm all the way up and reaching behind back prior to surgery. Does not remember an injury but was a  and carried everything in her left hand. Surgery Thursday 9-29-16 and stayed one night in the hospital. 2nd nerve block Friday morning and therapy in the hospital prior to be discharged. She has been icing the shoulder and taking the pain medicine. Shoulder manipulation done 12-9-16. OBJECTIVE:  Outcome Measure: Tool Used: Disabilities of the Arm, Shoulder and Hand (DASH) Questionnaire - Quick Version  Score:  Initial: 25/55  35/55 (Date: 11-1-16 ) Date: 1-6-17 33/55   Interpretation of Score: The DASH is designed to measure the activities of daily living in person's with upper extremity dysfunction or pain. Each section is scored on a 1-5 scale, 5 representing the greatest disability. The scores of each section are added together for a total score of 55. This number is divided by 11, followed by subtracting 1 and multiplying by 25 to get a percent score of disability.  This value represents the percentage disability: 0-20% minimal disability; 20-40% moderate disability; 40-60% severe disability; % dependent for care or exaggerated symptom behavior. Minimal detectable change is 12%.     ROM:        LUE AROM  L Shoulder Flexion: 140  L Shoulder Internal Rotation: (T10 behind back)  L Shoulder External Rotation: 60           Strength:     LUE Strength  L Shoulder Flexion: 4+  L Shoulder ABduction: 4-  L Shoulder Internal Rotation: 4+  L Shoulder External Rotation: 4+                Sandie Wright, PT, DPT

## 2017-12-21 ENCOUNTER — HOSPITAL ENCOUNTER (OUTPATIENT)
Dept: MAMMOGRAPHY | Age: 62
Discharge: HOME | End: 2017-12-21
Attending: OBSTETRICS & GYNECOLOGY
Payer: COMMERCIAL

## 2017-12-21 DIAGNOSIS — Z12.31 VISIT FOR SCREENING MAMMOGRAM: ICD-10-CM

## 2017-12-21 PROCEDURE — 77067 SCR MAMMO BI INCL CAD: CPT

## 2017-12-26 ENCOUNTER — HOSPITAL ENCOUNTER (OUTPATIENT)
Dept: MAMMOGRAPHY | Age: 62
Discharge: HOME | End: 2017-12-26
Attending: OBSTETRICS & GYNECOLOGY
Payer: COMMERCIAL

## 2017-12-26 DIAGNOSIS — M85.80 OSTEOPENIA, UNSPECIFIED LOCATION: ICD-10-CM

## 2017-12-26 PROCEDURE — 77080 DXA BONE DENSITY AXIAL: CPT

## 2017-12-27 NOTE — PROGRESS NOTES
Spine:  Osteopenia. Hips:  Osteopenia. Has been 2.2% decrease since last exam in 2012. Continue calcium, Vit D and exercise.

## 2019-03-19 ENCOUNTER — HOSPITAL ENCOUNTER (OUTPATIENT)
Dept: MAMMOGRAPHY | Age: 64
Discharge: HOME | End: 2019-03-19
Attending: OBSTETRICS & GYNECOLOGY
Payer: COMMERCIAL

## 2019-03-19 DIAGNOSIS — Z12.31 VISIT FOR SCREENING MAMMOGRAM: ICD-10-CM

## 2019-03-19 PROCEDURE — 77067 SCR MAMMO BI INCL CAD: CPT

## 2021-03-23 PROBLEM — S62.667A CLOSED NONDISPLACED FRACTURE OF DISTAL PHALANX OF LEFT LITTLE FINGER: Status: ACTIVE | Noted: 2021-03-23

## 2021-03-25 ENCOUNTER — TRANSCRIBE ORDER (OUTPATIENT)
Dept: SCHEDULING | Age: 66
End: 2021-03-25

## 2021-03-25 DIAGNOSIS — Z12.31 VISIT FOR SCREENING MAMMOGRAM: Primary | ICD-10-CM

## 2021-04-01 ENCOUNTER — HOSPITAL ENCOUNTER (OUTPATIENT)
Dept: MAMMOGRAPHY | Age: 66
Discharge: HOME | End: 2021-04-01
Attending: OBSTETRICS & GYNECOLOGY
Payer: MEDICARE

## 2021-04-01 DIAGNOSIS — N64.4 BREAST PAIN: ICD-10-CM

## 2021-04-01 PROCEDURE — 77062 BREAST TOMOSYNTHESIS BI: CPT

## 2022-03-19 PROBLEM — S62.667A CLOSED NONDISPLACED FRACTURE OF DISTAL PHALANX OF LEFT LITTLE FINGER: Status: ACTIVE | Noted: 2021-03-23

## 2024-07-31 ENCOUNTER — TRANSCRIBE ORDERS (OUTPATIENT)
Dept: SCHEDULING | Age: 69
End: 2024-07-31

## 2024-07-31 DIAGNOSIS — Z12.31 SCREENING MAMMOGRAM FOR HIGH-RISK PATIENT: Primary | ICD-10-CM

## 2024-08-01 ENCOUNTER — HOSPITAL ENCOUNTER (OUTPATIENT)
Dept: MAMMOGRAPHY | Age: 69
Discharge: HOME | End: 2024-08-01
Attending: OBSTETRICS & GYNECOLOGY
Payer: MEDICARE

## 2024-08-01 DIAGNOSIS — Z12.31 SCREENING MAMMOGRAM FOR HIGH-RISK PATIENT: ICD-10-CM

## 2024-08-01 PROCEDURE — 77063 BREAST TOMOSYNTHESIS BI: CPT

## 2025-07-07 ENCOUNTER — TELEPHONE (OUTPATIENT)
Dept: ORTHOPEDIC SURGERY | Age: 70
End: 2025-07-07

## 2025-07-07 NOTE — TELEPHONE ENCOUNTER
Called and LVM for pt to inform her that AGP does not typically see tibial plateau fractures. Pt is currently scheduled for an appointment with Dr. Collazo tomorrow. Advised pt to call back if any questions.

## 2025-07-08 ENCOUNTER — HOSPITAL ENCOUNTER (OUTPATIENT)
Dept: CT IMAGING | Age: 70
Discharge: HOME | End: 2025-07-10
Attending: ORTHOPAEDIC SURGERY
Payer: MEDICARE

## 2025-07-08 ENCOUNTER — OFFICE VISIT (OUTPATIENT)
Dept: ORTHOPEDIC SURGERY | Age: 70
End: 2025-07-08

## 2025-07-08 DIAGNOSIS — M25.561 RIGHT KNEE PAIN, UNSPECIFIED CHRONICITY: ICD-10-CM

## 2025-07-08 DIAGNOSIS — S82.141A CLOSED FRACTURE OF RIGHT TIBIAL PLATEAU, INITIAL ENCOUNTER: Primary | ICD-10-CM

## 2025-07-08 DIAGNOSIS — S82.141A CLOSED FRACTURE OF RIGHT TIBIAL PLATEAU, INITIAL ENCOUNTER: ICD-10-CM

## 2025-07-08 PROCEDURE — 73700 CT LOWER EXTREMITY W/O DYE: CPT

## 2025-07-08 NOTE — PROGRESS NOTES
Progress Note    Patient: Katarzyna Alford MRN: [id number]  SSN: [id number]    YOB: 1955  Age: 70 y.o.  Sex: female        7/8/2025      Subjective:     Patient is here today in follow-up.  She basically slipped in the shower sort of fell awkwardly with her right knee.  She denies any other problems besides her right knee no issues with her left lower extremity or bilateral upper extremities.  She is pretty healthy and active otherwise.  She does not take any blood thinners    Objective:     There were no vitals filed for this visit.       Physical Exam:     Skin - no open wounds or pressure sores  Motor and sensory function intact in RIGHT LOWER extremity  Pulses palpable in RIGHT LOWER extremity   She can actively perform a straight leg raise so her extensor mechanism is intact.  She does have a mild to moderate effusion of the right knee  XRAY FINDINGS:  I have reviewed her x-rays which appear to show a pretty well aligned lateral tibial plateau fracture    Assessment:     Right minimally displaced tibial plateau fracture    Plan:     I think on her plain films it looks like she has a right lateral tibial plateau fracture and looks like it is pretty well aligned but she is very young and active so I think it would be wise to get a CT scan just to make sure that she does not have any significant displacement.  I have explained to the patient that her probably would help us know exactly how to advance her activity if we had a CT and just to make sure she does not have a significant mount of joint depression that may make us want to treat this with operative intervention versus nonoperatively.  She seems okay with this so we will go ahead and get the CT scan ordered and I will either call them or see her back after the CT scan is done.  She is in a knee brace for now nonweightbearing right lower extremity until we get the CT done    Signed By: Stephen Collazo MD     July 8, 2025

## 2025-07-10 ENCOUNTER — TELEPHONE (OUTPATIENT)
Dept: ORTHOPEDIC SURGERY | Age: 70
End: 2025-07-10

## 2025-07-10 NOTE — TELEPHONE ENCOUNTER
Call made to Pt Per VO Dr. Collazo CT showing OLD injury and new Fx but it doesn't need surgery at this time will repeat Right Knee Xrays in 2wks . Pt voiced complete understanding. LH

## 2025-07-22 ENCOUNTER — OFFICE VISIT (OUTPATIENT)
Dept: ORTHOPEDIC SURGERY | Age: 70
End: 2025-07-22

## 2025-07-22 DIAGNOSIS — S82.141A CLOSED FRACTURE OF RIGHT TIBIAL PLATEAU, INITIAL ENCOUNTER: Primary | ICD-10-CM

## 2025-07-22 PROCEDURE — 99024 POSTOP FOLLOW-UP VISIT: CPT | Performed by: ORTHOPAEDIC SURGERY

## 2025-07-22 NOTE — PROGRESS NOTES
Progress Note    Patient: Katarzyna Alford MRN: [id number]  SSN: [id number]    YOB: 1955  Age: 70 y.o.  Sex: female        7/22/2025      Subjective:     Patient is here today in follow-up.  She is about 2 weeks out now from her injury with her right knee.  She definitely is feeling better.  She did have a CT scan which showed a very peripheral fracture so she really did not have that much involvement of the weightbearing surface which was very helpful from her CT scan.    Objective:     There were no vitals filed for this visit.       Physical Exam:     Skin - no open wounds or pressure sores  Motor and sensory function intact in RIGHT LOWER extremity  Pulses palpable in RIGHT LOWER extremity     XRAY FINDINGS:  [id number] right tibial plateau fracture, findings-AP and lateral views of the right knee shows the right tibial plateau fracture still very well aligned with no evidence of any further displacement or depression.  Her knee joint itself is very well aligned as far as there being no evidence of any subluxation.  Only a mild to moderate effusion.  Impression-well aligned right tibial plateau fracture    Assessment:     Well aligned right tibial plateau fracture    Plan:     So I think that since her CT scan showed such a peripheral fracture we can really allow her to be much more active I think she can be weightbearing as tolerated.  She can start some outpatient therapy for full active and passive range of motion full strengthening right knee.  This to be 2-3 times a week for 4 to 6 weeks.  We are also on a trial of a different knee brace to see if this makes her little bit more comfortable and a bit less bulky for her.  She has been walking in the pool which is a good exercise for her as well.  I will see her back in 3 weeks with AP and lateral right knee on return she will be about 5 weeks out at that time    Signed By: Stephen Collazo MD     July 22, 2025

## 2025-07-28 NOTE — THERAPY EVALUATION
Katarzyna Alford  : 1955  Primary: Medicare Part A And B (Medicare)  Secondary: [facility]  [place]  [facility] [address]  Phone: [phone]  Fax: [phone] Plan Frequency: 1-2 visits per week    Plan of Care/Certification Expiration Date: 25        Plan of Care/Certification Expiration Date:  Plan of Care/Certification Expiration Date: 25    Frequency/Duration: Plan Frequency: 1-2 visits per week      Time In/Out:   Time In: 1635  Time Out: 1725      PT Visit Info:    Progress Note Counter: 1      Visit Count:  1                OUTPATIENT PHYSICAL THERAPY:             Initial Assessment 2025               Episode (R tibial plateau fracture)         Treatment Diagnosis:     Difficulty in walking, not elsewhere classified  Right knee pain, unspecified chronicity  Stiffness of right knee, not elsewhere classified  Medical/Referring Diagnosis:    Closed fracture of right tibial plateau, initial encounter [S82.141A]    Referring Physician:  Stephen Collazo MD MD Orders:  PT Eval and Treat, R full active and passive ROM, full strengthening R knee  Return MD Appt:  25  Date of Onset:  Onset Date: 25     Allergies:  Codeine  Restrictions/Precautions:    WBAT per MD note dated 25      Medications Last Reviewed: 2025     SUBJECTIVE   History of Injury/Illness (Reason for Referral):  Patient fell while getting out of the shower on 2025. CT scan revealed tibial plateau fracture to R knee. Has had CT and X-ray. Knee swells on her, but did not not have much bruising afterwards. Currently, she is most limited by going to the restroom due to the low toilet seat and getting comfortable in bed. Is unable to squat down due to discomfort. Was NWB for 2 weeks. Patient wants to return to driving her car and does not want to limp. Patient has a stepdown den, which is the only interior steps. Has

## 2025-07-29 ENCOUNTER — HOSPITAL ENCOUNTER (OUTPATIENT)
Dept: PHYSICAL THERAPY | Age: 70
Setting detail: RECURRING SERIES
Discharge: HOME | End: 2025-08-01
Attending: ORTHOPAEDIC SURGERY
Payer: MEDICARE

## 2025-07-29 DIAGNOSIS — M25.661 STIFFNESS OF RIGHT KNEE, NOT ELSEWHERE CLASSIFIED: ICD-10-CM

## 2025-07-29 DIAGNOSIS — M25.561 RIGHT KNEE PAIN, UNSPECIFIED CHRONICITY: ICD-10-CM

## 2025-07-29 DIAGNOSIS — R26.2 DIFFICULTY IN WALKING, NOT ELSEWHERE CLASSIFIED: Primary | ICD-10-CM

## 2025-07-29 PROCEDURE — 97161 PT EVAL LOW COMPLEX 20 MIN: CPT

## 2025-07-29 PROCEDURE — 97110 THERAPEUTIC EXERCISES: CPT

## 2025-07-29 ASSESSMENT — PAIN SCALES - GENERAL: PAINLEVEL_OUTOF10: 5

## 2025-07-29 NOTE — PROGRESS NOTES
Katarzynaangeles Alford  : 1955  Primary: Medicare Part A [employer] B (Medicare)  Secondary: [facility] [place] [facility]  [place]  [facility] [address]  Phone: [phone]  Fax: [phone] Plan Frequency: 1-2 visits per week  Plan of Care/Certification Expiration Date: 25        Plan of Care/Certification Expiration Date:  Plan of Care/Certification Expiration Date: 25    Frequency/Duration: Plan Frequency: 1-2 visits per week      Time In/Out:   Time In: 1635  Time Out: 1725      PT Visit Info:    Progress Note Counter: 1      Visit Count:  1    OUTPATIENT PHYSICAL THERAPY:   Treatment Note 2025       Episode  (R tibial plateau fracture)               Treatment Diagnosis:    Difficulty in walking, not elsewhere classified  Right knee pain, unspecified chronicity  Stiffness of right knee, not elsewhere classified  Medical/Referring Diagnosis:    Closed fracture of right tibial plateau, initial encounter [S82.141A]    Referring Physician:  Stephen Collazo MD MD Orders:  PT Eval and Treat, R full active and passive ROM, strengthening R knee  Return MD Appt:  2025   Date of Onset:  Onset Date: 25     Allergies:   Codeine  Restrictions/Precautions:   WBAT per MD note dated 25      Interventions Planned (Treatment may consist of any combination of the following):     See Assessment Note    Subjective Comments:   Patient reports that she has a difficult time with ambulating without a limp and is unable to drive currently. Will be moving to [place] in September.  Initial Pain Level:     5/10  Post Session Pain Level:  5     /10  Medications Last Reviewed: 2025  Updated Objective Findings:  See Evaluation Note from today  Treatment   THERAPEUTIC EXERCISE: (25 minutes):    Exercises per grid below to improve mobility, strength, and balance.  Required moderate visual, verbal, manual, and tactile cues to promote proper body

## 2025-08-06 ENCOUNTER — HOSPITAL ENCOUNTER (OUTPATIENT)
Dept: PHYSICAL THERAPY | Age: 70
Setting detail: RECURRING SERIES
Discharge: HOME | End: 2025-08-09
Attending: ORTHOPAEDIC SURGERY
Payer: MEDICARE

## 2025-08-06 PROCEDURE — 97110 THERAPEUTIC EXERCISES: CPT

## 2025-08-06 PROCEDURE — 97140 MANUAL THERAPY 1/> REGIONS: CPT

## 2025-08-06 ASSESSMENT — PAIN SCALES - GENERAL: PAINLEVEL_OUTOF10: 1

## 2025-08-11 ENCOUNTER — HOSPITAL ENCOUNTER (OUTPATIENT)
Dept: PHYSICAL THERAPY | Age: 70
Setting detail: RECURRING SERIES
Discharge: HOME | End: 2025-08-14
Attending: ORTHOPAEDIC SURGERY
Payer: MEDICARE

## 2025-08-11 PROCEDURE — 97140 MANUAL THERAPY 1/> REGIONS: CPT

## 2025-08-11 PROCEDURE — 97110 THERAPEUTIC EXERCISES: CPT

## 2025-08-11 ASSESSMENT — PAIN SCALES - GENERAL: PAINLEVEL_OUTOF10: 1

## 2025-08-13 ENCOUNTER — HOSPITAL ENCOUNTER (OUTPATIENT)
Dept: PHYSICAL THERAPY | Age: 70
Setting detail: RECURRING SERIES
Discharge: HOME | End: 2025-08-16
Attending: ORTHOPAEDIC SURGERY
Payer: MEDICARE

## 2025-08-13 PROCEDURE — 97140 MANUAL THERAPY 1/> REGIONS: CPT

## 2025-08-13 PROCEDURE — 97110 THERAPEUTIC EXERCISES: CPT

## 2025-08-13 ASSESSMENT — PAIN SCALES - GENERAL: PAINLEVEL_OUTOF10: 1

## 2025-08-18 ENCOUNTER — HOSPITAL ENCOUNTER (OUTPATIENT)
Dept: PHYSICAL THERAPY | Age: 70
Setting detail: RECURRING SERIES
Discharge: HOME | End: 2025-08-21
Attending: ORTHOPAEDIC SURGERY
Payer: MEDICARE

## 2025-08-18 PROCEDURE — 97110 THERAPEUTIC EXERCISES: CPT

## 2025-08-18 PROCEDURE — 97140 MANUAL THERAPY 1/> REGIONS: CPT

## 2025-08-18 ASSESSMENT — PAIN SCALES - GENERAL: PAINLEVEL_OUTOF10: 1

## 2025-08-19 ENCOUNTER — OFFICE VISIT (OUTPATIENT)
Dept: ORTHOPEDIC SURGERY | Age: 70
End: 2025-08-19

## 2025-08-19 DIAGNOSIS — S82.141D CLOSED FRACTURE OF RIGHT TIBIAL PLATEAU WITH ROUTINE HEALING, SUBSEQUENT ENCOUNTER: Primary | ICD-10-CM

## 2025-08-19 PROCEDURE — 99024 POSTOP FOLLOW-UP VISIT: CPT | Performed by: ORTHOPAEDIC SURGERY

## 2025-08-21 ENCOUNTER — HOSPITAL ENCOUNTER (OUTPATIENT)
Dept: PHYSICAL THERAPY | Age: 70
Setting detail: RECURRING SERIES
Discharge: HOME | End: 2025-08-24
Attending: ORTHOPAEDIC SURGERY
Payer: MEDICARE

## 2025-08-21 PROCEDURE — 97110 THERAPEUTIC EXERCISES: CPT

## 2025-08-25 ENCOUNTER — HOSPITAL ENCOUNTER (OUTPATIENT)
Dept: PHYSICAL THERAPY | Age: 70
Setting detail: RECURRING SERIES
Discharge: HOME | End: 2025-08-28
Attending: ORTHOPAEDIC SURGERY
Payer: MEDICARE

## 2025-08-25 PROCEDURE — 97110 THERAPEUTIC EXERCISES: CPT

## 2025-08-25 ASSESSMENT — PAIN SCALES - GENERAL: PAINLEVEL_OUTOF10: 1

## 2025-08-27 ENCOUNTER — APPOINTMENT (OUTPATIENT)
Dept: PHYSICAL THERAPY | Age: 70
End: 2025-08-27
Attending: ORTHOPAEDIC SURGERY
Payer: MEDICARE

## 2025-09-02 ENCOUNTER — HOSPITAL ENCOUNTER (OUTPATIENT)
Dept: PHYSICAL THERAPY | Age: 70
Setting detail: RECURRING SERIES
Discharge: HOME | End: 2025-09-05
Attending: ORTHOPAEDIC SURGERY
Payer: MEDICARE

## 2025-09-02 PROCEDURE — 97110 THERAPEUTIC EXERCISES: CPT

## 2025-09-02 ASSESSMENT — PAIN SCALES - GENERAL: PAINLEVEL_OUTOF10: 1
